# Patient Record
Sex: FEMALE | Race: BLACK OR AFRICAN AMERICAN | NOT HISPANIC OR LATINO | Employment: STUDENT | URBAN - METROPOLITAN AREA
[De-identification: names, ages, dates, MRNs, and addresses within clinical notes are randomized per-mention and may not be internally consistent; named-entity substitution may affect disease eponyms.]

---

## 2017-01-13 ENCOUNTER — OFFICE VISIT (OUTPATIENT)
Dept: ORTHOPEDICS | Facility: CLINIC | Age: 32
End: 2017-01-13
Payer: MEDICARE

## 2017-01-13 ENCOUNTER — HOSPITAL ENCOUNTER (OUTPATIENT)
Dept: RADIOLOGY | Facility: HOSPITAL | Age: 32
Discharge: HOME | End: 2017-01-13
Attending: ORTHOPAEDIC SURGERY
Payer: MEDICARE

## 2017-01-13 VITALS
DIASTOLIC BLOOD PRESSURE: 94 MMHG | WEIGHT: 111.75 LBS | HEIGHT: 62 IN | HEART RATE: 102 BPM | SYSTOLIC BLOOD PRESSURE: 143 MMHG | BODY MASS INDEX: 20.56 KG/M2

## 2017-01-13 DIAGNOSIS — R52 PAIN: Primary | ICD-10-CM

## 2017-01-13 DIAGNOSIS — R52 PAIN: ICD-10-CM

## 2017-01-13 DIAGNOSIS — S92.063G: ICD-10-CM

## 2017-01-13 DIAGNOSIS — E11.610 CHARCOT'S ARTHROPATHY, DIABETIC: ICD-10-CM

## 2017-01-13 PROCEDURE — 99213 OFFICE O/P EST LOW 20 MIN: CPT | Mod: S$PBB,,, | Performed by: ORTHOPAEDIC SURGERY

## 2017-01-13 PROCEDURE — 73650 X-RAY EXAM OF HEEL: CPT | Mod: 26,50,, | Performed by: RADIOLOGY

## 2017-01-13 PROCEDURE — 99999 PR PBB SHADOW E&M-EST. PATIENT-LVL III: CPT | Mod: PBBFAC,,, | Performed by: ORTHOPAEDIC SURGERY

## 2017-01-13 PROCEDURE — 73650 X-RAY EXAM OF HEEL: CPT | Mod: 50,TC

## 2017-01-14 NOTE — PROGRESS NOTES
Ms. Murray returns today.  It has been 10 months since she sustained bilateral   calcaneus fractures with minimal trauma.  She has end-stage renal disease and   renal osteodystrophy.  She is currently on dialysis and is awaiting a kidney   transplant.  She is insulin-dependent diabetic as well.  The fractures of both   heels have been treated closed over time and she has made progress with regards   to pain and function.  Last time she was here was in October and her pain was   essentially resolved, but she was still having some continued swelling.  She   comes back today and again reports further improvement, but she still complained   of continued swelling and actually has had a bit more pain on the right side.    Examination today reveals continued bilateral swelling of her feet and   especially in the hindfoot region.  There is no erythema or warmth.  There is no   gross instability of the hindfoot and she has some minimal tenderness to   palpation.    I ordered and reviewed new x-rays today and on the right side it appears there   has been further collapse of the calcaneus.  There also appears to be a bit of   fragmentation on the dorsum of the talus.  The hindfoot still is in plantigrade   position.  The left heel also shows some continued lucencies.  At this point, I   am concerned she is having more of a Charcot collapse of her hind foot, although   clinically she does not have a significantly reddened or warm hindfoot.  It is   possible that this collapse has occurred in the last several months.  Her last   x-ray was done in July.  What I would like her to do at this point would be to   go back into her fracture boot on the right side and limit her activity as much   as possible.  I am going to have her return to see me in four weeks.  She should   have repeat x-ray of both heels at that time.      MAZIN/TRACIE  dd: 01/13/2017 16:22:52 (CST)  td: 01/14/2017 13:46:21 (CST)  Doc ID   #[id number]  Job ID #[id number]    CC:

## 2017-02-10 ENCOUNTER — HOSPITAL ENCOUNTER (OUTPATIENT)
Dept: RADIOLOGY | Facility: HOSPITAL | Age: 32
Discharge: HOME | End: 2017-02-10
Attending: ORTHOPAEDIC SURGERY
Payer: MEDICARE

## 2017-02-10 ENCOUNTER — OFFICE VISIT (OUTPATIENT)
Dept: ORTHOPEDICS | Facility: CLINIC | Age: 32
End: 2017-02-10
Payer: MEDICARE

## 2017-02-10 VITALS — SYSTOLIC BLOOD PRESSURE: 133 MMHG | HEART RATE: 99 BPM | DIASTOLIC BLOOD PRESSURE: 88 MMHG | HEIGHT: 62 IN

## 2017-02-10 DIAGNOSIS — R52 PAIN: Primary | ICD-10-CM

## 2017-02-10 DIAGNOSIS — E11.610 CHARCOT'S ARTHROPATHY, DIABETIC: ICD-10-CM

## 2017-02-10 DIAGNOSIS — R52 PAIN: ICD-10-CM

## 2017-02-10 DIAGNOSIS — S92.063G: ICD-10-CM

## 2017-02-10 PROCEDURE — 73650 X-RAY EXAM OF HEEL: CPT | Mod: 26,50,, | Performed by: RADIOLOGY

## 2017-02-10 PROCEDURE — 99999 PR PBB SHADOW E&M-EST. PATIENT-LVL III: CPT | Mod: PBBFAC,,, | Performed by: ORTHOPAEDIC SURGERY

## 2017-02-10 PROCEDURE — 99213 OFFICE O/P EST LOW 20 MIN: CPT | Mod: S$PBB,,, | Performed by: ORTHOPAEDIC SURGERY

## 2017-02-10 PROCEDURE — 73650 X-RAY EXAM OF HEEL: CPT | Mod: 50,TC

## 2017-02-10 RX ORDER — SCOPALAMINE 1 MG/3D
PATCH, EXTENDED RELEASE TRANSDERMAL
Refills: 6 | COMMUNITY
Start: 2016-12-12 | End: 2017-05-01

## 2017-02-10 RX ORDER — PANTOPRAZOLE SODIUM 40 MG/1
40 TABLET, DELAYED RELEASE ORAL 2 TIMES DAILY
Refills: 0 | COMMUNITY
Start: 2016-11-20 | End: 2017-05-01

## 2017-02-10 RX ORDER — ETONOGESTREL 68 MG/1
IMPLANT SUBCUTANEOUS
COMMUNITY
Start: 2017-01-25 | End: 2023-02-23

## 2017-02-10 RX ORDER — LORAZEPAM 0.5 MG/1
0.5 TABLET ORAL 3 TIMES DAILY PRN
Refills: 0 | COMMUNITY
Start: 2016-11-20 | End: 2017-05-01

## 2017-02-10 RX ORDER — OMEPRAZOLE 40 MG/1
40 CAPSULE, DELAYED RELEASE ORAL DAILY
Refills: 6 | COMMUNITY
Start: 2016-12-12 | End: 2017-05-01

## 2017-02-10 RX ORDER — CALCITRIOL 0.25 UG/1
0.25 CAPSULE ORAL DAILY
COMMUNITY
End: 2021-01-20

## 2017-02-12 NOTE — PROGRESS NOTES
Ms. Murray returns today.  It has been 11 months, since she sustained bilateral   calcaneus fractures with minimal trauma.  She is a 31-year-old female who has   end-stage renal disease and renal osteodystrophy and is on dialysis, awaiting a   kidney transplant.  She is an insulin-dependent diabetic and has peripheral   neuropathy.  When she was here a month ago, I had repeated x-rays, and there was   evidence of some collapse of the calcaneus on the right side compared to a   previous x-ray.  She did not have any significant swelling or erythema   consistent with an active Charcot process, but I wanted her to come back today   to re-x-ray the foot to see if there are any further changes.  I put her in a   fracture boot, after the last visit as well.  She reports improved pain in the   boot.  On exam, she does not have any significant swelling at this time.  I   ordered new x-rays today, and there has been no further collapse over the last   few weeks.  So, I feel this is more than likely stable.  I am going to have her   continue to use the boot on an as needed basis.  I am going to have her return   to see me in six weeks.  I would like to repeat standing x-rays of her heels at   that time.      SEBASTIAN  dd: 02/11/2017 08:30:15 (CST)  td: 02/11/2017 20:42:32 (CST)  Doc ID   #[id number]  Job ID #[id number]    CC:

## 2017-04-10 ENCOUNTER — OFFICE VISIT (OUTPATIENT)
Dept: ORTHOPEDICS | Facility: CLINIC | Age: 32
End: 2017-04-10
Payer: MEDICARE

## 2017-04-10 ENCOUNTER — HOSPITAL ENCOUNTER (OUTPATIENT)
Dept: RADIOLOGY | Facility: HOSPITAL | Age: 32
Discharge: HOME | End: 2017-04-10
Attending: ORTHOPAEDIC SURGERY
Payer: MEDICARE

## 2017-04-10 VITALS — HEIGHT: 62 IN | BODY MASS INDEX: 20.63 KG/M2 | WEIGHT: 112.13 LBS

## 2017-04-10 DIAGNOSIS — S92.063G: Primary | ICD-10-CM

## 2017-04-10 DIAGNOSIS — E11.610 CHARCOT'S ARTHROPATHY, DIABETIC: ICD-10-CM

## 2017-04-10 DIAGNOSIS — S92.063G: ICD-10-CM

## 2017-04-10 PROCEDURE — 73650 X-RAY EXAM OF HEEL: CPT | Mod: 26,50,, | Performed by: RADIOLOGY

## 2017-04-10 PROCEDURE — 99213 OFFICE O/P EST LOW 20 MIN: CPT | Mod: S$PBB,,, | Performed by: ORTHOPAEDIC SURGERY

## 2017-04-10 PROCEDURE — 99999 PR PBB SHADOW E&M-EST. PATIENT-LVL II: CPT | Mod: PBBFAC,,, | Performed by: ORTHOPAEDIC SURGERY

## 2017-04-10 PROCEDURE — 73650 X-RAY EXAM OF HEEL: CPT | Mod: 50,TC

## 2017-04-11 NOTE — PROGRESS NOTES
Ms. Murray returns today.  This is a 31-year-old female with end-stage renal   disease, on dialysis, who has insulin-dependent diabetes with severe peripheral   neuropathy.  She sustained bilateral intra-articular calcaneus fractures over a   year ago now.  This was a result of minor trauma of jumping up and down.  She   was initially treated closed and over the course of time appeared to be   resolving.  When she returned to see me in January, she had some increased   swelling of the right side and x-rays revealed some further collapse of the   calcaneus.  I had placed her back into a boot and suggested protected   weightbearing.  When she was here last in February, her swelling was improved.    She returns today and she has increased swelling of the right heel with no real   increase in symptoms.  She does have some discomfort.  Her left foot, although   still has some swelling has been stable.  I ordered new x-rays today and there   does not appear to be any further collapse of either bone.  Nevertheless, I am   concerned she has some ongoing Charcot process.  I would recommend a period of   total contact casting on the right side with protected weightbearing.  So we are   going to put her in a cast today.  She will return in two weeks for a cast   change.      MAZIN/TRACIE  dd: 04/11/2017 06:54:20 (CDT)  td: 04/11/2017 14:00:04 (REDDT)  Doc ID   #[id number]  Job ID #[id number]    CC:     This office note has been dictated.

## 2017-04-17 ENCOUNTER — OFFICE VISIT (OUTPATIENT)
Dept: ORTHOPEDICS | Facility: CLINIC | Age: 32
End: 2017-04-17
Payer: MEDICARE

## 2017-04-17 DIAGNOSIS — Z47.89 CAST DISCOMFORT: Primary | ICD-10-CM

## 2017-04-17 PROCEDURE — 29405 APPL SHORT LEG CAST: CPT | Mod: PBBFAC | Performed by: PHYSICIAN ASSISTANT

## 2017-04-17 PROCEDURE — 29405 APPL SHORT LEG CAST: CPT | Mod: 58,S$PBB,, | Performed by: PHYSICIAN ASSISTANT

## 2017-04-17 PROCEDURE — 99499 UNLISTED E&M SERVICE: CPT | Mod: S$PBB,,, | Performed by: PHYSICIAN ASSISTANT

## 2017-04-17 NOTE — PROGRESS NOTES
Patient presents to clinic for cast change. Stated that cast was rubbing her shin causing pain.   Cast removed, abrasion to anterior shin just below tibial tubercle.   Cast change  New SLC placed by cindy.

## 2017-05-01 ENCOUNTER — OFFICE VISIT (OUTPATIENT)
Dept: ORTHOPEDICS | Facility: CLINIC | Age: 32
End: 2017-05-01
Payer: MEDICARE

## 2017-05-01 VITALS — WEIGHT: 112 LBS | HEIGHT: 62 IN | BODY MASS INDEX: 20.61 KG/M2

## 2017-05-01 DIAGNOSIS — E10.40 TYPE 1 DIABETES MELLITUS WITH DIABETIC NEUROPATHY: ICD-10-CM

## 2017-05-01 DIAGNOSIS — S92.063G: ICD-10-CM

## 2017-05-01 DIAGNOSIS — E11.610 CHARCOT'S ARTHROPATHY, DIABETIC: Primary | ICD-10-CM

## 2017-05-01 PROCEDURE — 99213 OFFICE O/P EST LOW 20 MIN: CPT | Mod: PBBFAC | Performed by: ORTHOPAEDIC SURGERY

## 2017-05-01 PROCEDURE — 99213 OFFICE O/P EST LOW 20 MIN: CPT | Mod: S$PBB,,, | Performed by: ORTHOPAEDIC SURGERY

## 2017-05-01 PROCEDURE — 99999 PR PBB SHADOW E&M-EST. PATIENT-LVL III: CPT | Mod: PBBFAC,,, | Performed by: ORTHOPAEDIC SURGERY

## 2017-05-02 NOTE — PROGRESS NOTES
Ms. Murray returns today for followup of her Charcot arthropathy involving both   heels.  She is a 31-year-old female with end-stage renal disease and   insulin-dependent diabetes with severe peripheral neuropathy.  I have had her in   a total contact cast now for the last three weeks due to recent increased   swelling of her right heel.  She had sustained bilateral calcaneus fractures   about a year ago with minimal trauma.  She reports the discomfort that she has   had in her right heel is improved.  I removed the cast today.  There seems to be   a bit less swelling and some of the skin is wrinkled.  There is still some mild   warmth.  I am going to continue with the total contact cast for the next two   weeks.  I am going to have her return at that time for reevaluation.  I also   recommend that she get plugged in for routine diabetic foot care, so I am going   to make a referral for Podiatry.      MAZIN/TRACIE  dd: 05/01/2017 12:53:00 (XOCHITL)  td: 05/02/2017 05:26:59 (XOCHITL)  Doc ID   #[id number]  Job ID #[id number]    CC:

## 2017-05-04 ENCOUNTER — TELEPHONE (OUTPATIENT)
Dept: ORTHOPEDICS | Facility: CLINIC | Age: 32
End: 2017-05-04

## 2017-05-04 NOTE — TELEPHONE ENCOUNTER
----- Message from Natalia Barba sent at 5/4/2017  1:21 PM CDT -----  Contact: self  Patient ask if can move her appointment to 5/12-anytime

## 2017-05-04 NOTE — TELEPHONE ENCOUNTER
Spoke with pt.  Advised that Dr Ruano is not in the office on 5/12,   Pt will keep her 5/15 appointment

## 2017-05-15 ENCOUNTER — HOSPITAL ENCOUNTER (OUTPATIENT)
Dept: RADIOLOGY | Facility: HOSPITAL | Age: 32
Discharge: HOME | End: 2017-05-15
Attending: ORTHOPAEDIC SURGERY
Payer: MEDICARE

## 2017-05-15 ENCOUNTER — OFFICE VISIT (OUTPATIENT)
Dept: ORTHOPEDICS | Facility: CLINIC | Age: 32
End: 2017-05-15
Payer: MEDICARE

## 2017-05-15 DIAGNOSIS — S92.063G: ICD-10-CM

## 2017-05-15 DIAGNOSIS — E11.610 CHARCOT'S ARTHROPATHY, DIABETIC: ICD-10-CM

## 2017-05-15 DIAGNOSIS — E11.610 CHARCOT'S ARTHROPATHY, DIABETIC: Primary | ICD-10-CM

## 2017-05-15 PROCEDURE — 99213 OFFICE O/P EST LOW 20 MIN: CPT | Mod: S$PBB,,, | Performed by: ORTHOPAEDIC SURGERY

## 2017-05-15 PROCEDURE — 73650 X-RAY EXAM OF HEEL: CPT | Mod: TC,RT

## 2017-05-15 PROCEDURE — 73650 X-RAY EXAM OF HEEL: CPT | Mod: 26,RT,, | Performed by: RADIOLOGY

## 2017-05-15 PROCEDURE — 99999 PR PBB SHADOW E&M-EST. PATIENT-LVL I: CPT | Mod: PBBFAC,,, | Performed by: ORTHOPAEDIC SURGERY

## 2017-05-16 NOTE — PROGRESS NOTES
Ms. Murray returns today for followup of her Charcot arthropathy involving both   heels.  This is a 31-year-old female with end-stage renal disease and   insulin-dependent diabetes with severe peripheral neuropathy who sustained   bilateral calcaneus fractures with minimal trauma about a year ago.  She was   treated closed, but more recently her right heel showed progressive collapse and   I have had her in a total contact cast now for the last six weeks or so due to   increased swelling.  Her cast is removed today.  The swelling is improved.    There is minimal warmth.  I ordered and reviewed a new x-ray today and there is   increased sclerosis of the calcaneus suggesting that this is starting to   consolidate.  At this point, I am going to allow her to go back into her   fracture boot.  I want her to get into Podiatry for diabetic foot care   management as well as bracing and orthotics for her Charcot feet.  I am going to   have her return to see me as needed.      MAZIN/TRACIE  dd: 05/15/2017 15:48:20 (XOCHITL)  td: 05/16/2017 08:51:02 (XOCHITL)  Doc ID   #[id number]  Job ID #[id number]    CC:

## 2017-05-30 ENCOUNTER — OFFICE VISIT (OUTPATIENT)
Dept: PODIATRY | Facility: CLINIC | Age: 32
End: 2017-05-30
Payer: MEDICARE

## 2017-05-30 VITALS
HEIGHT: 62 IN | WEIGHT: 109 LBS | BODY MASS INDEX: 20.06 KG/M2 | RESPIRATION RATE: 18 BRPM | DIASTOLIC BLOOD PRESSURE: 91 MMHG | HEART RATE: 96 BPM | SYSTOLIC BLOOD PRESSURE: 131 MMHG

## 2017-05-30 DIAGNOSIS — E11.610 DIABETIC CHARCOT'S FOOT: Primary | ICD-10-CM

## 2017-05-30 PROCEDURE — 99203 OFFICE O/P NEW LOW 30 MIN: CPT | Mod: S$PBB,,, | Performed by: PODIATRIST

## 2017-05-30 PROCEDURE — 99999 PR PBB SHADOW E&M-EST. PATIENT-LVL III: CPT | Mod: PBBFAC,,, | Performed by: PODIATRIST

## 2017-05-30 PROCEDURE — 99213 OFFICE O/P EST LOW 20 MIN: CPT | Mod: PBBFAC | Performed by: PODIATRIST

## 2017-05-30 RX ORDER — CICLOPIROX 80 MG/ML
SOLUTION TOPICAL
COMMUNITY
Start: 2017-04-13 | End: 2021-01-20

## 2017-05-30 NOTE — LETTER
May 30, 2017      Gonzalo Ruano MD  [address]  [facility] [address]           [facility]  [address]  [facility] [address]  Phone: [phone]          Patient: Nina Murray   MR Number: [id number]   YOB: 1985   Date of Visit: 5/30/2017       Dear Dr. Gonzalo Ruano:    Thank you for referring Nina Murray to me for evaluation. Attached you will find relevant portions of my assessment and plan of care.    If you have questions, please do not hesitate to call me. I look forward to following Nina Murray along with you.    Sincerely,    Annamaria Taylor, FARNAZ    Enclosure  CC:  No Recipients    If you would like to receive this communication electronically, please contact [email or web address] or [phone] to request more information on [employer] access.    For providers and/or their staff who would like to refer a patient to [facility], please contact us through our one-stop-shop provider referral line, [facility], at [phone].    If you feel you have received this communication in error or would no longer like to receive these types of communications, please e-mail [email or web address]

## 2017-05-30 NOTE — PROGRESS NOTES
"Subjective:      Patient ID: Nina Murray is a 31 y.o. female.    Chief Complaint: PCP ([facility] ); Diabetic Foot Exam; Foot Problem; and Ankle Injury (both ankles swelling x 1 year ago )    Nina is a 31 y.o. female who presents to the clinic for evaluation and treatment of high risk feet. Nina has a past medical history of Diabetes mellitus and Renal failure. The patient's chief complaint is f/u  B/l charcot. She recently was in a TCC on the R fot charcot. She switched to a walking boot 5/17 w/o issues .    PCP: Primary Doctor No    Date Last Seen by PCP:   Chief Complaint   Patient presents with    PCP     [facility]     Diabetic Foot Exam    Foot Problem    Ankle Injury     both ankles swelling x 1 year ago        Hemoglobin A1C   Date Value Ref Range Status   03/04/2013 7.2 (H) 4.0 - 6.2 % Final       Review of Systems   Constitution: Negative for chills, decreased appetite and fever.   Cardiovascular: Negative for leg swelling.   Musculoskeletal: Negative for arthritis, joint pain, joint swelling and myalgias.   Gastrointestinal: Negative for nausea and vomiting.   Neurological: Negative for loss of balance, numbness and paresthesias.           Objective:       Vitals:    05/30/17 1047   BP: (!) 131/91   Pulse: 96   Resp: 18   Weight: 49.4 kg (109 lb)   Height: 5' 2" (1.575 m)   PainSc: 0-No pain        Physical Exam   Constitutional: She is oriented to person, place, and time. She appears well-developed and well-nourished.   Cardiovascular:   Pulses:       Dorsalis pedis pulses are 2+ on the right side, and 2+ on the left side.        Posterior tibial pulses are 2+ on the right side, and 2+ on the left side.   Musculoskeletal: She exhibits no edema or tenderness.        Right ankle: Normal.        Left ankle: Normal.        Right foot: There is no swelling, no crepitus and no deformity.        Left foot: There is no swelling, no crepitus and no deformity.   Adequate joint range of " motion without pain, limitation, nor crepitation Bilateral feet and ankle joints. Muscle strength is 5/5 in all groups bilaterally.      Flattened , wide b/l heel.  No crepitus noted Flexible pes planus foot type w/ medial arch collapse and mild gastroc equinus      Lymphadenopathy:   No palpable lymph nodes   Neurological: She is alert and oriented to person, place, and time. She has normal strength. A sensory deficit is present.   Skin: Skin is warm, dry and intact. No rash noted. No erythema. Nails show no clubbing.   Psychiatric: She has a normal mood and affect. Her behavior is normal.             Assessment:       Encounter Diagnosis   Name Primary?    Diabetic Charcot's foot Yes         Plan:       Nina was seen today for pcp, diabetic foot exam, foot problem and ankle injury.    Diagnoses and all orders for this visit:    Diabetic Charcot's foot      I counseled the patient on her conditions, their implications and medical management.  Stable b/l Charcot   Continue walking boot R   Will re image in 3 weeks to assess stability   Plan to convert to dm shoes w/ custom inserts, she reports recently receiving shoes this december   Will bring shoes and inserts to next apt for evaluation         .

## 2017-06-20 ENCOUNTER — OFFICE VISIT (OUTPATIENT)
Dept: PODIATRY | Facility: CLINIC | Age: 32
End: 2017-06-20
Payer: MEDICARE

## 2017-06-20 ENCOUNTER — HOSPITAL ENCOUNTER (OUTPATIENT)
Dept: RADIOLOGY | Facility: HOSPITAL | Age: 32
Discharge: HOME | End: 2017-06-20
Attending: PODIATRIST
Payer: MEDICARE

## 2017-06-20 VITALS
BODY MASS INDEX: 20.06 KG/M2 | DIASTOLIC BLOOD PRESSURE: 87 MMHG | WEIGHT: 109 LBS | HEART RATE: 94 BPM | SYSTOLIC BLOOD PRESSURE: 145 MMHG | HEIGHT: 62 IN | RESPIRATION RATE: 18 BRPM

## 2017-06-20 DIAGNOSIS — E11.610 DIABETIC CHARCOT'S FOOT: ICD-10-CM

## 2017-06-20 DIAGNOSIS — E11.610 DIABETIC CHARCOT'S FOOT: Primary | ICD-10-CM

## 2017-06-20 PROCEDURE — 73630 X-RAY EXAM OF FOOT: CPT | Mod: 50,TC

## 2017-06-20 PROCEDURE — 99213 OFFICE O/P EST LOW 20 MIN: CPT | Mod: S$PBB,,, | Performed by: PODIATRIST

## 2017-06-20 PROCEDURE — 73630 X-RAY EXAM OF FOOT: CPT | Mod: 26,50,, | Performed by: RADIOLOGY

## 2017-06-20 PROCEDURE — 99999 PR PBB SHADOW E&M-EST. PATIENT-LVL III: CPT | Mod: PBBFAC,,, | Performed by: PODIATRIST

## 2017-06-20 RX ORDER — MUPIROCIN 20 MG/G
OINTMENT TOPICAL
COMMUNITY
Start: 2017-06-16 | End: 2021-01-20

## 2017-06-20 NOTE — LETTER
June 20, 2017      Gonzalo Ruano MD  [address] [facility] [address]           [facility]  [address]  [facility] [address]  Phone: [phone]          Patient: Nina Murray   MR Number: [id number]   YOB: 1985   Date of Visit: 6/20/2017       Dear Dr. Gonzalo Ruano:    Thank you for referring Nina Murray to me for evaluation. Attached you will find relevant portions of my assessment and plan of care.    If you have questions, please do not hesitate to call me. I look forward to following Nina Murray along with you.    Sincerely,    Annamaria Taylor, FARNAZ    Enclosure  CC:  No Recipients    If you would like to receive this communication electronically, please contact [email or web address] or [phone] to request more information on [employer] Link access.    For providers and/or their staff who would like to refer a patient to [facility], please contact us through our one-stop-shop provider referral line, [facility] [place], at [phone].    If you feel you have received this communication in error or would no longer like to receive these types of communications, please e-mail [email or web address]

## 2017-06-20 NOTE — PROGRESS NOTES
"Subjective:      Patient ID: Nina Murray is a 31 y.o. female.    Chief Complaint: Follow-up (X-ray ); Foot Problem; and Foot Swelling    Nina is a 31 y.o. female who presents to the clinic for evaluation and treatment of high risk feet. Nina has a past medical history of Diabetes mellitus and Renal failure. The patient's chief complaint is f/u  B/l charcot. She recently was in a TCC on the R foot charcot. Has been in boot R w/o issues   PCP: Primary Doctor No    Date Last Seen by PCP:   Chief Complaint   Patient presents with    Follow-up     X-ray     Foot Problem    Foot Swelling       Hemoglobin A1C   Date Value Ref Range Status   03/04/2013 7.2 (H) 4.0 - 6.2 % Final       Review of Systems   Constitution: Negative for chills, decreased appetite and fever.   Cardiovascular: Negative for leg swelling.   Musculoskeletal: Negative for arthritis, joint pain, joint swelling and myalgias.   Gastrointestinal: Negative for nausea and vomiting.   Neurological: Negative for loss of balance, numbness and paresthesias.           Objective:       Vitals:    06/20/17 1156   BP: (!) 145/87   Pulse: 94   Resp: 18   Weight: 49.4 kg (109 lb)   Height: 5' 2" (1.575 m)   PainSc: 0-No pain        Physical Exam   Constitutional: She is oriented to person, place, and time. She appears well-developed and well-nourished.   Cardiovascular:   Pulses:       Dorsalis pedis pulses are 2+ on the right side, and 2+ on the left side.        Posterior tibial pulses are 2+ on the right side, and 2+ on the left side.   Musculoskeletal: She exhibits no edema or tenderness.        Right ankle: Normal.        Left ankle: Normal.        Right foot: There is no swelling, no crepitus and no deformity.        Left foot: There is no swelling, no crepitus and no deformity.   Adequate joint range of motion without pain, limitation, nor crepitation Bilateral feet and ankle joints. Muscle strength is 5/5 in all groups bilaterally.      Flattened , " wide b/l heel.  No crepitus noted Flexible pes planus foot type w/ medial arch collapse and mild gastroc equinus      Lymphadenopathy:   No palpable lymph nodes   Neurological: She is alert and oriented to person, place, and time. She has normal strength. A sensory deficit is present.   Skin: Skin is warm, dry and intact. No rash noted. No erythema. Nails show no clubbing.   Psychiatric: She has a normal mood and affect. Her behavior is normal.             Assessment:       Encounter Diagnosis   Name Primary?    Diabetic Charcot's foot Yes         Plan:       Nina was seen today for follow-up, foot problem and foot swelling.    Diagnoses and all orders for this visit:    Diabetic Charcot's foot  -     X-Ray Foot Complete Bilateral; Future      I counseled the patient on her conditions, their implications and medical management.  Stable  R calcaneal charcot, although L heel shows increased lucency   DM shoe R, transition to walking boot L   Will re image in 3-4 weeks to assess stability         .

## 2017-07-05 ENCOUNTER — OFFICE VISIT (OUTPATIENT)
Dept: PODIATRY | Facility: CLINIC | Age: 32
End: 2017-07-05
Payer: MEDICARE

## 2017-07-05 ENCOUNTER — HOSPITAL ENCOUNTER (OUTPATIENT)
Dept: RADIOLOGY | Facility: HOSPITAL | Age: 32
Discharge: HOME | End: 2017-07-05
Attending: PODIATRIST
Payer: MEDICARE

## 2017-07-05 VITALS
DIASTOLIC BLOOD PRESSURE: 100 MMHG | SYSTOLIC BLOOD PRESSURE: 154 MMHG | HEART RATE: 90 BPM | RESPIRATION RATE: 18 BRPM | HEIGHT: 62 IN | WEIGHT: 109 LBS | BODY MASS INDEX: 20.06 KG/M2

## 2017-07-05 DIAGNOSIS — E11.610 DIABETIC CHARCOT'S FOOT: Primary | ICD-10-CM

## 2017-07-05 DIAGNOSIS — E11.610 DIABETIC CHARCOT'S FOOT: ICD-10-CM

## 2017-07-05 PROCEDURE — 73630 X-RAY EXAM OF FOOT: CPT | Mod: 50,TC

## 2017-07-05 PROCEDURE — 73630 X-RAY EXAM OF FOOT: CPT | Mod: 26,50,, | Performed by: RADIOLOGY

## 2017-07-05 PROCEDURE — 99999 PR PBB SHADOW E&M-EST. PATIENT-LVL III: CPT | Mod: PBBFAC,,, | Performed by: PODIATRIST

## 2017-07-05 PROCEDURE — 99213 OFFICE O/P EST LOW 20 MIN: CPT | Mod: S$PBB,,, | Performed by: PODIATRIST

## 2017-07-05 RX ORDER — LIDOCAINE AND PRILOCAINE 25; 25 MG/G; MG/G
CREAM TOPICAL
COMMUNITY
Start: 2017-06-29 | End: 2021-01-20

## 2017-07-05 NOTE — PROGRESS NOTES
"Subjective:      Patient ID: Nina Murray is a 31 y.o. female.    Chief Complaint: Follow-up; Foot Pain; and Foot Swelling    Nina is a 31 y.o. female who presents to the clinic for evaluation and treatment of high risk feet. Nina has a past medical history of Diabetes mellitus and Renal failure. The patient's chief complaint is f/u  B/l charcot. S. Has been in boot L w/o issues   PCP: Primary Doctor No    Date Last Seen by PCP:   Chief Complaint   Patient presents with    Follow-up    Foot Pain    Foot Swelling       Hemoglobin A1C   Date Value Ref Range Status   03/04/2013 7.2 (H) 4.0 - 6.2 % Final       Review of Systems   Constitution: Negative for chills, decreased appetite and fever.   Cardiovascular: Negative for leg swelling.   Skin: Positive for dry skin.   Musculoskeletal: Negative for arthritis, joint pain, joint swelling and myalgias.   Gastrointestinal: Negative for nausea and vomiting.   Neurological: Negative for loss of balance, numbness and paresthesias.           Objective:       Vitals:    07/05/17 1140 07/05/17 1153   BP: (!) 151/104 (!) 154/100   Pulse: 90    Resp: 18    Weight: 49.4 kg (109 lb)    Height: 5' 2" (1.575 m)    PainSc:   4    PainLoc: Foot         Physical Exam   Constitutional: She is oriented to person, place, and time. She appears well-developed and well-nourished.   Cardiovascular:   Pulses:       Dorsalis pedis pulses are 2+ on the right side, and 2+ on the left side.        Posterior tibial pulses are 2+ on the right side, and 2+ on the left side.   Musculoskeletal: She exhibits no edema or tenderness.        Right ankle: Normal.        Left ankle: Normal.        Right foot: There is no swelling, no crepitus and no deformity.        Left foot: There is no swelling, no crepitus and no deformity.   Adequate joint range of motion without pain, limitation, nor crepitation Bilateral feet and ankle joints. Muscle strength is 5/5 in all groups bilaterally.      Flattened , " wide b/l heel.  No crepitus noted Flexible pes planus foot type w/ medial arch collapse and mild gastroc equinus   Mild increased warmth b/l feet. No crepitus   Lymphadenopathy:   No palpable lymph nodes   Neurological: She is alert and oriented to person, place, and time. She has normal strength. A sensory deficit is present.   Skin: Skin is warm, dry and intact. No rash noted. No erythema. Nails show no clubbing.   Psychiatric: She has a normal mood and affect. Her behavior is normal.             Assessment:       Encounter Diagnosis   Name Primary?    Diabetic Charcot's foot Yes         Plan:       Nina was seen today for follow-up, foot pain and foot swelling.    Diagnoses and all orders for this visit:    Diabetic Charcot's foot  -     X-Ray Foot Complete Bilateral; Future      I counseled the patient on her conditions, their implications and medical management.  Stable  Charcot w/ mild increased warmth?   Continue  boot L . Pt has R boot at home .   Will re image today to assure stability   Will re image in 3-4 weeks to assess stability         .

## 2017-07-05 NOTE — LETTER
July 5, 2017      Gonzalo Ruano MD  [address] [facility] [address]           [facility]  [address]  [facility] [facility] [address]  Phone: [phone]          Patient: Nina Murray   MR Number: [id number]   YOB: 1985   Date of Visit: 7/5/2017       Dear Dr. Gonzalo Ruano:    Thank you for referring Nina Murray to me for evaluation. Attached you will find relevant portions of my assessment and plan of care.    If you have questions, please do not hesitate to call me. I look forward to following Nina Murray along with you.    Sincerely,    Annamaria Taylor, FARNAZ    Enclosure  CC:  No Recipients    If you would like to receive this communication electronically, please contact [email or web address] or [phone] to request more information on [employer] Link access.    For providers and/or their staff who would like to refer a patient to [facility], please contact us through our one-stop-shop provider referral line, [facility], at [phone].    If you feel you have received this communication in error or would no longer like to receive these types of communications, please e-mail [email or web address]

## 2017-09-01 ENCOUNTER — OFFICE VISIT (OUTPATIENT)
Dept: PODIATRY | Facility: CLINIC | Age: 32
End: 2017-09-01
Payer: MEDICARE

## 2017-09-01 VITALS — BODY MASS INDEX: 18.95 KG/M2 | WEIGHT: 103 LBS | HEIGHT: 62 IN | RESPIRATION RATE: 18 BRPM

## 2017-09-01 DIAGNOSIS — E11.610 DIABETIC CHARCOT'S FOOT: Primary | ICD-10-CM

## 2017-09-01 PROCEDURE — 99213 OFFICE O/P EST LOW 20 MIN: CPT | Mod: S$PBB,,, | Performed by: PODIATRIST

## 2017-09-01 PROCEDURE — 99214 OFFICE O/P EST MOD 30 MIN: CPT | Mod: PBBFAC | Performed by: PODIATRIST

## 2017-09-01 PROCEDURE — 99999 PR PBB SHADOW E&M-EST. PATIENT-LVL IV: CPT | Mod: PBBFAC,,, | Performed by: PODIATRIST

## 2017-09-01 RX ORDER — ASPIRIN 81 MG/1
TABLET ORAL
Refills: 2 | COMMUNITY
Start: 2017-07-27 | End: 2021-04-01

## 2017-09-01 RX ORDER — SULFAMETHOXAZOLE AND TRIMETHOPRIM 800; 160 MG/1; MG/1
TABLET ORAL
COMMUNITY
Start: 2017-07-27 | End: 2021-04-01

## 2017-09-01 RX ORDER — POLYETHYLENE GLYCOL 3350 17 G/17G
POWDER, FOR SOLUTION ORAL
COMMUNITY
Start: 2017-08-01 | End: 2021-01-20

## 2017-09-01 RX ORDER — METOCLOPRAMIDE 5 MG/1
5 TABLET ORAL 4 TIMES DAILY
COMMUNITY
End: 2021-01-20

## 2017-09-01 RX ORDER — TACROLIMUS 1 MG/1
CAPSULE, GELATIN COATED ORAL
Refills: 2 | COMMUNITY
Start: 2017-07-27

## 2017-09-01 RX ORDER — LABETALOL 100 MG/1
TABLET, FILM COATED ORAL
COMMUNITY
Start: 2017-08-02 | End: 2021-01-20

## 2017-09-01 RX ORDER — PANTOPRAZOLE SODIUM 40 MG/1
TABLET, DELAYED RELEASE ORAL
COMMUNITY
Start: 2017-07-27 | End: 2021-04-01

## 2017-09-01 RX ORDER — PREDNISONE 5 MG/1
TABLET ORAL
Refills: 2 | COMMUNITY
Start: 2017-07-27

## 2017-09-01 RX ORDER — MYCOPHENOLIC ACID 360 MG/1
TABLET, DELAYED RELEASE ORAL
Refills: 2 | COMMUNITY
Start: 2017-07-27 | End: 2021-04-01

## 2017-09-01 RX ORDER — VALGANCICLOVIR 450 MG/1
TABLET, FILM COATED ORAL
COMMUNITY
Start: 2017-07-28 | End: 2021-04-01

## 2017-09-01 RX ORDER — TRAMADOL HYDROCHLORIDE 50 MG/1
TABLET ORAL
COMMUNITY
Start: 2017-08-01 | End: 2021-01-20

## 2017-09-01 RX ORDER — FLUCONAZOLE 100 MG/1
TABLET ORAL
COMMUNITY
Start: 2017-07-27 | End: 2018-06-06 | Stop reason: ALTCHOICE

## 2017-09-02 NOTE — PROGRESS NOTES
"Subjective:      Patient ID: Nina Murray is a 31 y.o. female.    Chief Complaint: Follow-up (Dr. Cinthya Corona 2016 ); Foot Problem (Charcot's ); and Foot Pain    Nina is a 31 y.o. female who presents to the clinic for evaluation and treatment of high risk feet. Nina has a past medical history of Diabetes mellitus and Renal failure. The patient's chief complaint is f/u  B/l charcot. Reports recent transplant. Feels great. Improved leg and foot swelling. No pain to ankles/feet.     PCP: Primary Doctor No    Date Last Seen by PCP:   Chief Complaint   Patient presents with    Follow-up     Dr. Cinthya Corona 2016     Foot Problem     Charcot's     Foot Pain       Hemoglobin A1C   Date Value Ref Range Status   03/04/2013 7.2 (H) 4.0 - 6.2 % Final       Review of Systems   Constitution: Negative for chills, decreased appetite and fever.   Cardiovascular: Negative for leg swelling.   Skin: Positive for dry skin.   Musculoskeletal: Negative for arthritis, joint pain, joint swelling and myalgias.   Gastrointestinal: Negative for nausea and vomiting.   Neurological: Negative for loss of balance, numbness and paresthesias.           Objective:       Vitals:    09/01/17 0907   Resp: 18   Weight: 46.7 kg (103 lb)   Height: 5' 2" (1.575 m)   PainSc: 0-No pain        Physical Exam   Constitutional: She is oriented to person, place, and time. She appears well-developed and well-nourished.   Cardiovascular:   Pulses:       Dorsalis pedis pulses are 2+ on the right side, and 2+ on the left side.        Posterior tibial pulses are 2+ on the right side, and 2+ on the left side.   Musculoskeletal: She exhibits no edema or tenderness.        Right ankle: Normal.        Left ankle: Normal.        Right foot: There is no swelling, no crepitus and no deformity.        Left foot: There is no swelling, no crepitus and no deformity.   Adequate joint range of motion without pain, limitation, nor crepitation Bilateral feet and ankle joints. " Muscle strength is 5/5 in all groups bilaterally.      Flattened , wide b/l heel.  No crepitus noted Flexible pes planus foot type w/ medial arch collapse and mild gastroc equinus   No d increased warmth b/l feet. No crepitus   Lymphadenopathy:   No palpable lymph nodes   Neurological: She is alert and oriented to person, place, and time. She has normal strength. A sensory deficit is present.   Skin: Skin is warm, dry and intact. No rash noted. No erythema. Nails show no clubbing.   Psychiatric: She has a normal mood and affect. Her behavior is normal.             Assessment:       Encounter Diagnosis   Name Primary?    Diabetic Charcot's foot Yes         Plan:       Nina was seen today for follow-up, foot problem and foot pain.    Diagnoses and all orders for this visit:    Diabetic Charcot's foot      I counseled the patient on her conditions, their implications and medical management.  Stable  Charcot b/l w/o signs of reactivation   No crepitus   Full ROM  Continue DM shoes/inserts    .

## 2017-10-20 ENCOUNTER — TELEPHONE (OUTPATIENT)
Dept: PODIATRY | Facility: CLINIC | Age: 32
End: 2017-10-20

## 2017-10-20 NOTE — TELEPHONE ENCOUNTER
Patient would like to speak with Dr cuevas she needs consult on disabilty paperwork for benefits. I call [employer] and transfer the call for information regarding how she can apply

## 2017-10-20 NOTE — TELEPHONE ENCOUNTER
----- Message from Ashleigh Paz sent at 10/20/2017  1:59 PM CDT -----  Contact: Pt  Pt is calling to speak with nurse in regards to applying for disability due to condition.    Pt can be reached at [phone].  Thank you

## 2017-11-02 ENCOUNTER — OFFICE VISIT (OUTPATIENT)
Dept: PODIATRY | Facility: CLINIC | Age: 32
End: 2017-11-02
Payer: MEDICARE

## 2017-11-02 VITALS
SYSTOLIC BLOOD PRESSURE: 113 MMHG | DIASTOLIC BLOOD PRESSURE: 78 MMHG | HEIGHT: 62 IN | RESPIRATION RATE: 18 BRPM | WEIGHT: 107 LBS | BODY MASS INDEX: 19.69 KG/M2 | HEART RATE: 103 BPM

## 2017-11-02 DIAGNOSIS — E11.610 DIABETIC CHARCOT'S FOOT: Primary | ICD-10-CM

## 2017-11-02 PROCEDURE — 99999 PR PBB SHADOW E&M-EST. PATIENT-LVL III: CPT | Mod: PBBFAC,,, | Performed by: PODIATRIST

## 2017-11-02 PROCEDURE — 99213 OFFICE O/P EST LOW 20 MIN: CPT | Mod: PBBFAC | Performed by: PODIATRIST

## 2017-11-02 PROCEDURE — 99213 OFFICE O/P EST LOW 20 MIN: CPT | Mod: S$GLB,,, | Performed by: PODIATRIST

## 2017-11-02 RX ORDER — METOCLOPRAMIDE 10 MG/1
TABLET ORAL
Refills: 2 | COMMUNITY
Start: 2017-09-26 | End: 2021-01-20

## 2017-11-02 RX ORDER — ASPIRIN 325 MG
TABLET ORAL
Refills: 11 | COMMUNITY
Start: 2017-10-31

## 2017-11-02 RX ORDER — FLUDROCORTISONE ACETATE 0.1 MG/1
TABLET ORAL
COMMUNITY
Start: 2017-10-30 | End: 2021-04-01

## 2017-11-02 RX ORDER — TACROLIMUS 0.5 MG/1
CAPSULE, GELATIN COATED ORAL
Refills: 6 | COMMUNITY
Start: 2017-10-02 | End: 2021-04-01

## 2017-11-02 RX ORDER — LANCETS 30 GAUGE
EACH MISCELLANEOUS
Refills: 0 | COMMUNITY
Start: 2017-10-05 | End: 2021-01-20

## 2017-11-02 RX ORDER — PROMETHAZINE HYDROCHLORIDE 25 MG/1
TABLET ORAL
COMMUNITY
Start: 2017-08-21 | End: 2017-11-02 | Stop reason: SDUPTHER

## 2017-11-02 RX ORDER — LORAZEPAM 1 MG/1
TABLET ORAL
COMMUNITY
Start: 2017-08-30 | End: 2021-01-20

## 2017-11-02 RX ORDER — SCOLOPAMINE TRANSDERMAL SYSTEM 1 MG/1
PATCH, EXTENDED RELEASE TRANSDERMAL
Refills: 0 | COMMUNITY
Start: 2017-08-15 | End: 2021-01-20

## 2017-11-02 NOTE — LETTER
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 These deformities do limit Xavi ability to stand, walk, and perform ADLs. due to this chronic condition, I recommend she receive permament disability. If you have any questions or concerns, or if I can be of further assistance, please do not hesitate to contact my office.    Sincerely,          Annamaria Taylor DPM  Podiatry Clinic  [facility]

## 2017-11-03 NOTE — PROGRESS NOTES
"Subjective:      Patient ID: Nina Murray is a 31 y.o. female.    Chief Complaint: Follow-up; Foot Problem (Charcot's foot ); and Foot Pain    Nina is a 31 y.o. female who presents to the clinic for evaluation and treatment of high risk feet. Nina has a past medical history of Diabetes mellitus and Renal failure. The patient's chief complaint is f/u  B/l charcot. Reports recent transplant is doing well.  She reports she does get some foot pain and swelling after prolonged standing. Today she would like to discuss disability options .    PCP: Primary Doctor No    Date Last Seen by PCP:   Chief Complaint   Patient presents with    Follow-up    Foot Problem     Charcot's foot     Foot Pain       Hemoglobin A1C   Date Value Ref Range Status   03/04/2013 7.2 (H) 4.0 - 6.2 % Final       Review of Systems   Constitution: Negative for chills, decreased appetite and fever.   Cardiovascular: Negative for leg swelling.   Skin: Negative for color change, dry skin and flushing.   Musculoskeletal: Negative for arthritis, joint pain, joint swelling and myalgias.   Gastrointestinal: Negative for nausea and vomiting.   Neurological: Positive for numbness. Negative for loss of balance and paresthesias.           Objective:       Vitals:    11/02/17 1002   BP: 113/78   Pulse: 103   Resp: 18   Weight: 48.5 kg (107 lb)   Height: 5' 2" (1.575 m)   PainSc: 0-No pain        Physical Exam   Constitutional: She is oriented to person, place, and time. She appears well-developed and well-nourished.   Cardiovascular:   Pulses:       Dorsalis pedis pulses are 2+ on the right side, and 2+ on the left side.        Posterior tibial pulses are 2+ on the right side, and 2+ on the left side.   Musculoskeletal: She exhibits no edema or tenderness.        Right ankle: Normal.        Left ankle: Normal.        Right foot: There is no swelling, no crepitus and no deformity.        Left foot: There is no swelling, no crepitus and no deformity. "   Adequate joint range of motion without pain, limitation, nor crepitation Bilateral feet and ankle joints. Muscle strength is 5/5 in all groups bilaterally.      Flattened , wide b/l heel.  No crepitus noted Flexible pes planus foot type w/ medial arch collapse and mild gastroc equinus   No in increased warmth b/l feet. No crepitus   Lymphadenopathy:   No palpable lymph nodes   Neurological: She is alert and oriented to person, place, and time. She has normal strength. A sensory deficit is present.   Skin: Skin is warm, dry and intact. No rash noted. No erythema. Nails show no clubbing.   Psychiatric: She has a normal mood and affect. Her behavior is normal.   Vitals reviewed.            Assessment:       Encounter Diagnosis   Name Primary?    Diabetic Charcot's foot Yes         Plan:       Nina was seen today for follow-up, foot problem and foot pain.    Diagnoses and all orders for this visit:    Diabetic Charcot's foot      I counseled the patient on her conditions, their implications and medical management.  Currently patients charcot remains stable.   However charcot that affects the rearfoot/heel is inherently unstable in nature due to the location and bone type( Cancellous) .   She will need life time follow up and close management to help prevent charcot reactivation.   She will also require life long use of custom dm shoes w/ custom orthoses.  Due to the location of her charcot she is at high risk for future foot/leg amputation and will require lifelong close monitoring and foot protection.

## 2018-06-06 ENCOUNTER — OFFICE VISIT (OUTPATIENT)
Dept: PODIATRY | Facility: CLINIC | Age: 33
End: 2018-06-06
Payer: MEDICARE

## 2018-06-06 VITALS
BODY MASS INDEX: 21.35 KG/M2 | DIASTOLIC BLOOD PRESSURE: 75 MMHG | SYSTOLIC BLOOD PRESSURE: 121 MMHG | HEIGHT: 62 IN | RESPIRATION RATE: 18 BRPM | HEART RATE: 89 BPM | WEIGHT: 116 LBS

## 2018-06-06 DIAGNOSIS — E11.610 DIABETIC CHARCOT'S FOOT: Primary | ICD-10-CM

## 2018-06-06 PROCEDURE — 99213 OFFICE O/P EST LOW 20 MIN: CPT | Mod: S$GLB,,, | Performed by: PODIATRIST

## 2018-06-06 PROCEDURE — 3008F BODY MASS INDEX DOCD: CPT | Mod: CPTII,S$GLB,, | Performed by: PODIATRIST

## 2018-06-06 PROCEDURE — 99999 PR PBB SHADOW E&M-EST. PATIENT-LVL III: CPT | Mod: PBBFAC,,, | Performed by: PODIATRIST

## 2018-06-06 RX ORDER — ONDANSETRON 8 MG/1
8 TABLET, ORALLY DISINTEGRATING ORAL 3 TIMES DAILY PRN
Refills: 5 | COMMUNITY
Start: 2018-05-15 | End: 2021-04-01

## 2018-06-06 NOTE — PROGRESS NOTES
"Subjective:      Patient ID: Nina Murray is a 32 y.o. female.    Chief Complaint: PCP (Dr. Cinthya Foster 12/17) and Diabetic Foot Exam (Charcot's )    Nina is a 32 y.o. female who presents to the clinic for evaluation and treatment of high risk feet. Nina has a past medical history of Diabetes mellitus and Renal failure. The patient's chief complaint is f/u  B/l charcot. No pain to the feet     PCP: Primary Doctor No    Date Last Seen by PCP:   Chief Complaint   Patient presents with    PCP     Dr. Cinthya Foster 12/17    Diabetic Foot Exam     Charcot's        Hemoglobin A1C   Date Value Ref Range Status   03/04/2013 7.2 (H) 4.0 - 6.2 % Final       Review of Systems   Constitution: Negative for chills, decreased appetite and fever.   Cardiovascular: Negative for leg swelling.   Skin: Negative for color change, dry skin and flushing.   Musculoskeletal: Negative for arthritis, joint pain, joint swelling and myalgias.   Gastrointestinal: Negative for nausea and vomiting.   Neurological: Positive for numbness. Negative for loss of balance and paresthesias.           Objective:       Vitals:    06/06/18 1026   BP: 121/75   Pulse: 89   Resp: 18   Weight: 52.6 kg (116 lb)   Height: 5' 2" (1.575 m)   PainSc: 0-No pain        Physical Exam   Constitutional: She is oriented to person, place, and time. She appears well-developed and well-nourished.   Cardiovascular:   Pulses:       Dorsalis pedis pulses are 2+ on the right side, and 2+ on the left side.        Posterior tibial pulses are 2+ on the right side, and 2+ on the left side.   Musculoskeletal: She exhibits no edema or tenderness.        Right ankle: Normal.        Left ankle: Normal.        Right foot: There is no swelling, no crepitus and no deformity.        Left foot: There is no swelling, no crepitus and no deformity.   Adequate joint range of motion without pain, limitation, nor crepitation Bilateral feet and ankle joints. Muscle strength is " 5/5 in all groups bilaterally.      Flattened , wide b/l heel.  No crepitus noted Flexible pes planus foot type w/ medial arch collapse and mild gastroc equinus   No in increased warmth b/l feet. No crepitus   Lymphadenopathy:   No palpable lymph nodes   Neurological: She is alert and oriented to person, place, and time. She has normal strength. A sensory deficit is present.   Skin: Skin is warm, dry and intact. No rash noted. No erythema. Nails show no clubbing.   Psychiatric: She has a normal mood and affect. Her behavior is normal.   Vitals reviewed.            Assessment:       Encounter Diagnosis   Name Primary?    Diabetic Charcot's foot Yes         Plan:       Nina was seen today for pcp and diabetic foot exam.    Diagnoses and all orders for this visit:    Diabetic Charcot's foot      I counseled the patient on her conditions, their implications and medical management.  Currently patients charcot remains stable.   Recommend bracing for R ankle due to charcot changes, she may benefit from custom [place] brace in the future   Patient fitted and dispensed Gauntlet style lace up and instructed on use

## 2018-08-10 ENCOUNTER — TELEPHONE (OUTPATIENT)
Dept: PODIATRY | Facility: CLINIC | Age: 33
End: 2018-08-10

## 2018-08-10 NOTE — TELEPHONE ENCOUNTER
----- Message from Paige Pacheco sent at 8/10/2018  1:48 PM CDT -----  Contact: Self/ [phone]  Patient Returning Call from [facility]    Who Left Message for Patient: RUSSEL Hitchcock   Communication Preference: Cell phone [phone]

## 2018-08-10 NOTE — TELEPHONE ENCOUNTER
Left message for pat. To let her know that Dr. MARSHALL Is out on maternity leave right now and that we only have ankle braces, they are not coming in leather. So for right now I'm not sure what she was talking about, ask her to call back and let us know if she has any further questions

## 2018-08-10 NOTE — TELEPHONE ENCOUNTER
----- Message from Nathan Lopez sent at 8/10/2018 10:57 AM CDT -----  Contact: self  Pt stated she would like the leather ankle cuff that was offered by doctor Palacios.Pt can be reached at [phone].

## 2018-08-13 ENCOUNTER — TELEPHONE (OUTPATIENT)
Dept: PODIATRY | Facility: CLINIC | Age: 33
End: 2018-08-13

## 2018-08-14 ENCOUNTER — TELEPHONE (OUTPATIENT)
Dept: PODIATRY | Facility: CLINIC | Age: 33
End: 2018-08-14

## 2018-08-14 NOTE — TELEPHONE ENCOUNTER
Called patient to let her know that I talked to Dr. Davis about the [place] brace and he wrote a RX for her. Told Patient that I will send it out to her house by mail and if she has further questions to call me back. Send the orthotic vendor list too

## 2018-08-16 ENCOUNTER — TELEPHONE (OUTPATIENT)
Dept: PODIATRY | Facility: CLINIC | Age: 33
End: 2018-08-16

## 2018-08-16 NOTE — TELEPHONE ENCOUNTER
----- Message from Sandra Marroquin sent at 8/16/2018 10:20 AM CDT -----  Contact: patient  Please call pt at [phone]. Patient has questions regarding the forms sent to her by MD office    Thank you

## 2018-08-16 NOTE — TELEPHONE ENCOUNTER
Spoke with pt wanted to know if she can got to any store on the vendors list for a brace  Made aware yes and it has  The insurance  At the bottom per pt okay thank you

## 2018-12-04 ENCOUNTER — OFFICE VISIT (OUTPATIENT)
Dept: PODIATRY | Facility: CLINIC | Age: 33
End: 2018-12-04
Payer: MEDICARE

## 2018-12-04 VITALS
RESPIRATION RATE: 18 BRPM | WEIGHT: 104.06 LBS | BODY MASS INDEX: 19.15 KG/M2 | SYSTOLIC BLOOD PRESSURE: 136 MMHG | HEIGHT: 62 IN | DIASTOLIC BLOOD PRESSURE: 85 MMHG | HEART RATE: 95 BPM

## 2018-12-04 DIAGNOSIS — E11.610 DIABETIC CHARCOT'S FOOT: Primary | ICD-10-CM

## 2018-12-04 PROCEDURE — 99213 OFFICE O/P EST LOW 20 MIN: CPT | Mod: S$GLB,,, | Performed by: PODIATRIST

## 2018-12-04 PROCEDURE — 99999 PR PBB SHADOW E&M-EST. PATIENT-LVL III: CPT | Mod: PBBFAC,,, | Performed by: PODIATRIST

## 2018-12-04 PROCEDURE — 3008F BODY MASS INDEX DOCD: CPT | Mod: CPTII,S$GLB,, | Performed by: PODIATRIST

## 2018-12-05 NOTE — PROGRESS NOTES
"Subjective:      Patient ID: Nina Murray is a 33 y.o. female.    Chief Complaint: PCP (Cinthya GREWAL MD. [facility] 11/18); Foot Problem (Rt foot ); and Follow-up (Charcot's )    Nina is a 33 y.o. female who presents to the clinic for evaluation and treatment of high risk feet. Nina has a past medical history of Diabetes mellitus and Renal failure. The patient's chief complaint is f/u  B/l charcot. No pain to the feet     PCP: Primary Doctor No    Date Last Seen by PCP:   Chief Complaint   Patient presents with    PCP     Cinthya GREWAL MD. [facility] 11/18    Foot Problem     Rt foot     Follow-up     Charcot's        Hemoglobin A1C   Date Value Ref Range Status   03/04/2013 7.2 (H) 4.0 - 6.2 % Final       Review of Systems   Constitution: Negative for chills, decreased appetite and fever.   Cardiovascular: Negative for leg swelling.   Skin: Negative for color change, dry skin and flushing.   Musculoskeletal: Positive for joint pain. Negative for arthritis, joint swelling and myalgias.   Gastrointestinal: Negative for nausea and vomiting.   Neurological: Positive for numbness. Negative for loss of balance and paresthesias.           Objective:       Vitals:    12/04/18 1116   BP: 136/85   Pulse: 95   Resp: 18   Weight: 47.2 kg (104 lb 0.9 oz)   Height: 5' 2" (1.575 m)   PainSc: 0-No pain        Physical Exam   Constitutional: She is oriented to person, place, and time. She appears well-developed and well-nourished.   Cardiovascular:   Pulses:       Dorsalis pedis pulses are 2+ on the right side, and 2+ on the left side.        Posterior tibial pulses are 2+ on the right side, and 2+ on the left side.   Musculoskeletal: She exhibits no edema or tenderness.        Right ankle: Normal.        Left ankle: Normal.        Right foot: There is no swelling, no crepitus and no deformity.        Left foot: There is no swelling, no crepitus and no deformity.   Adequate joint range of motion without pain, " limitation, nor crepitation Bilateral feet and ankle joints. Muscle strength is 5/5 in all groups bilaterally.      Flattened , wide b/l heel.  No crepitus noted Flexible pes planus foot type w/ medial arch collapse and mild gastroc equinus   No in increased warmth b/l feet. No crepitus   Lymphadenopathy:   No palpable lymph nodes   Neurological: She is alert and oriented to person, place, and time. She has normal strength. A sensory deficit is present.   Skin: Skin is warm, dry and intact. No rash noted. No erythema. Nails show no clubbing.   Psychiatric: She has a normal mood and affect. Her behavior is normal.   Vitals reviewed.            Assessment:       Encounter Diagnosis   Name Primary?    Diabetic Charcot's foot Yes         Plan:       Nina was seen today for pcp, foot problem and follow-up.    Diagnoses and all orders for this visit:    Diabetic Charcot's foot      I counseled the patient on her conditions, their implications and medical management.  Currently patients charcot remains stable.   Custom brace is pending . She has been fitted    RTC 6 m

## 2019-06-19 ENCOUNTER — OFFICE VISIT (OUTPATIENT)
Dept: PODIATRY | Facility: CLINIC | Age: 34
End: 2019-06-19
Payer: MEDICARE

## 2019-06-19 VITALS
WEIGHT: 112.44 LBS | BODY MASS INDEX: 20.69 KG/M2 | DIASTOLIC BLOOD PRESSURE: 76 MMHG | HEART RATE: 95 BPM | SYSTOLIC BLOOD PRESSURE: 121 MMHG | HEIGHT: 62 IN | RESPIRATION RATE: 18 BRPM

## 2019-06-19 DIAGNOSIS — E11.610 DIABETIC CHARCOT'S FOOT: Primary | ICD-10-CM

## 2019-06-19 PROCEDURE — 3008F PR BODY MASS INDEX (BMI) DOCUMENTED: ICD-10-PCS | Mod: CPTII,S$GLB,, | Performed by: PODIATRIST

## 2019-06-19 PROCEDURE — 99214 PR OFFICE/OUTPT VISIT, EST, LEVL IV, 30-39 MIN: ICD-10-PCS | Mod: S$GLB,,, | Performed by: PODIATRIST

## 2019-06-19 PROCEDURE — 3008F BODY MASS INDEX DOCD: CPT | Mod: CPTII,S$GLB,, | Performed by: PODIATRIST

## 2019-06-19 PROCEDURE — 99999 PR PBB SHADOW E&M-EST. PATIENT-LVL III: ICD-10-PCS | Mod: PBBFAC,,, | Performed by: PODIATRIST

## 2019-06-19 PROCEDURE — 99214 OFFICE O/P EST MOD 30 MIN: CPT | Mod: S$GLB,,, | Performed by: PODIATRIST

## 2019-06-19 PROCEDURE — 99999 PR PBB SHADOW E&M-EST. PATIENT-LVL III: CPT | Mod: PBBFAC,,, | Performed by: PODIATRIST

## 2019-06-19 NOTE — PROGRESS NOTES
Subjective:      Patient ID: Nina Murray is a 33 y.o. female.    Chief Complaint: PCP (Raphael Mendes ); Diabetic Foot Exam; and charcot's    Nina is a 33 y.o. female who presents to the clinic upon referral from Dr. Peggy pedersen. provider found  for evaluation and treatment of diabetic feet. Nina has a past medical history of Diabetes mellitus and Renal failure. Patient relates no major problem with feet. Only complaints today consist of yearly comprehensive diabetic  foot examination. No longer on DM meds 2/2 transplant. No foot pain .    PCP: Primary Doctor No    Date Last Seen by PCP:   Chief Complaint   Patient presents with    PCP     Raphael Mendes     Diabetic Foot Exam    charcot's         Current shoe gear: Casual shoes    Hemoglobin A1C   Date Value Ref Range Status   03/04/2013 7.2 (H) 4.0 - 6.2 % Final           Review of Systems   Constitution: Negative for chills, decreased appetite and fever.   Cardiovascular: Negative for leg swelling.   Skin: Positive for poor wound healing.   Musculoskeletal: Negative for arthritis, joint pain, joint swelling and myalgias.   Gastrointestinal: Negative for nausea and vomiting.   Neurological: Negative for loss of balance, numbness and paresthesias.           There is no problem list on file for this patient.      Current Outpatient Medications on File Prior to Visit   Medication Sig Dispense Refill    aspirin (ECOTRIN) 81 MG EC tablet TK 1 T PO QD  2    calcitRIOL (ROCALTROL) 0.25 MCG Cap Take 0.25 mcg by mouth once daily.      calcium acetate (PHOSLO) 667 mg capsule   3    ciclopirox (PENLAC) 8 % Soln       fludrocortisone (FLORINEF) 0.1 mg Tab       HUMALOG 100 unit/mL injection   2    labetalol (NORMODYNE) 100 MG tablet       leg brace (ANKLE BRACE) [facility] (1) [place] ankle brace for Right ankle. 1 each 0    lidocaine-prilocaine (EMLA) cream       LORazepam (ATIVAN) 1 MG tablet       MELPAQUE HP 4 % Crea   3    metoclopramide HCl (REGLAN) 10 MG  tablet TK 1 T PO TID  2    metoclopramide HCl (REGLAN) 5 MG tablet Take 5 mg by mouth 4 (four) times daily.      mupirocin (BACTROBAN) 2 % ointment       MYFORTIC 360 mg TbEC TK 2 TS PO BID  2    NEXPLANON 68 mg Impl       ondansetron (ZOFRAN-ODT) 8 MG TbDL Take 8 mg by mouth 3 (three) times daily as needed.  5    ONE DAILY MULTIVITAMIN 400 mcg Tab TK 1 T PO D  11    ONETOUCH ULTRA TEST Strp USE TO TEST BLOOD SUGAR 6 TIMES DAILY  6    ONETOUCH ULTRA2 kit USE TO TEST BLOOD SUGAR 3 TIMES A DAY AS DIRECTED  0    pantoprazole (PROTONIX) 40 MG tablet       polyethylene glycol (GLYCOLAX) 17 gram PwPk       predniSONE (DELTASONE) 5 MG tablet TK 4 TS PO D  2    PROGRAF 0.5 mg Cap TK ONE C PO  BID  6    PROGRAF 1 mg Cap TK 5 CS PO BID  2    scopolamine (TRANSDERM-SCOP) 1.3-1.5 mg (1 mg over 3 days) NATANAEL 1 PATCH AA Q 72 H  0    sulfamethoxazole-trimethoprim 800-160mg (BACTRIM DS) 800-160 mg Tab       tramadol (ULTRAM) 50 mg tablet       valganciclovir (VALCYTE) 450 mg Tab        No current facility-administered medications on file prior to visit.        Review of patient's allergies indicates:   Allergen Reactions    Vicodin [hydrocodone-acetaminophen] Nausea And Vomiting       Past Surgical History:   Procedure Laterality Date     SECTION, CLASSIC         History reviewed. No pertinent family history.    Social History     Socioeconomic History    Marital status: Single     Spouse name: Not on file    Number of children: Not on file    Years of education: Not on file    Highest education level: Not on file   Occupational History    Not on file   Social Needs    Financial resource strain: Not on file    Food insecurity:     Worry: Not on file     Inability: Not on file    Transportation needs:     Medical: Not on file     Non-medical: Not on file   Tobacco Use    Smoking status: Never Smoker    Smokeless tobacco: Never Used   Substance and Sexual Activity    Alcohol use: No      "Alcohol/week: 0.0 oz    Drug use: No    Sexual activity: Yes     Partners: Male     Birth control/protection: None   Lifestyle    Physical activity:     Days per week: Not on file     Minutes per session: Not on file    Stress: Not on file   Relationships    Social connections:     Talks on phone: Not on file     Gets together: Not on file     Attends Orthodox service: Not on file     Active member of club or organization: Not on file     Attends meetings of clubs or organizations: Not on file     Relationship status: Not on file   Other Topics Concern    Not on file   Social History Narrative    Not on file               Objective:       Vitals:    06/19/19 0945   BP: 121/76   Pulse: 95   Resp: 18   Weight: 51 kg (112 lb 7 oz)   Height: 5' 2" (1.575 m)   PainSc: 0-No pain        Physical Exam   Constitutional: She appears well-developed and well-nourished.  Non-toxic appearance. She does not have a sickly appearance. No distress.   alert and oriented x 3.    Cardiovascular:   Pulses:       Dorsalis pedis pulses are 2+ on the right side, and 2+ on the left side.        Posterior tibial pulses are 2+ on the right side, and 2+ on the left side.    Capillary refill time is within normal limits. Digital hair present.    Pulmonary/Chest: No respiratory distress.   Musculoskeletal: She exhibits deformity (charcot ankle b/l ).        Right ankle: No tenderness. No lateral malleolus, no medial malleolus, no AITFL, no CF ligament and no posterior TFL tenderness found. Achilles tendon exhibits no pain, no defect and normal Bush's test results.        Left ankle: No tenderness. No lateral malleolus, no medial malleolus, no AITFL, no CF ligament and no posterior TFL tenderness found. Achilles tendon exhibits no pain, no defect and normal Bush's test results.        Right foot: There is no tenderness and no bony tenderness.        Left foot: There is no tenderness and no bony tenderness.   Adequate joint range of " motion without pain, limitation, nor crepitation Bilateral feet and ankle joints. Muscle strength is 5/5 in all groups bilaterally.        Stable b/l ankle charcot    Feet:   Right Foot:   Protective Sensation: 5 sites tested. 5 sites sensed.   Left Foot:   Protective Sensation: 5 sites tested. 5 sites sensed.   Lymphadenopathy:   No lymphatic streaking     Neurological: She displays no atrophy. No sensory deficit.   Light touch present     Skin: Skin is warm, dry and intact. No rash noted. She is not diaphoretic. No cyanosis. No pallor. Nails show no clubbing.   Skin is of normal turgor.   Normal temperature gradient.  Examination of the skin reveals no evidence of significant rashes, open lesions, suspicious appearing nevi or other concerning lesions.      Toenails 1-5 bilaterally are neatly trimmed; of normal color and thickness     Psychiatric: Her mood appears not anxious. Her affect is not inappropriate. Her speech is not slurred. She is not combative. She is communicative. She is attentive.   Nursing note and vitals reviewed.            Assessment:       Encounter Diagnosis   Name Primary?    Diabetic Charcot's foot Yes         Plan:       Nina was seen today for pcp, diabetic foot exam and charcot's.    Diagnoses and all orders for this visit:    Diabetic Charcot's foot      I counseled the patient on her conditions, their implications and medical management.      - Shoe inspection. Diabetic Foot Education. Patient reminded of the importance of good nutrition and blood sugar control to help prevent podiatric complications of diabetes. Patient instructed on proper foot hygeine. We discussed wearing proper shoe gear, daily foot inspections, never walking without protective shoe gear, caution putting sharp instruments to feet     - Discussed DM foot care:  Wear comfortable, proper fitting shoes. Wash feet daily. Dry well. After drying, apply moisturizer to feet (no lotion to webspaces). Inspect feet daily  for skin breaks, blisters, swelling, or redness. Wear cotton socks (preferably white)  Change socks every day. Do NOT walk barefoot. Do NOT use heating pads or warm/hot water soaks     - Discussed importance of daily moisturizer to the feet such as Gold bonds diabetic foot cream    - Patient is low risk for developing lower extremity issues secondary to diabetes. I recommend continued yearly diabetic foot examinations.     - Patients PCP can perform yearly foot checks . Currently  patient has no pedal manifestations of DM    - Patients with out pedal manifestations of DM, do not qualify for nail/callus trimming     - RTC in  PRN     .

## 2020-01-15 NOTE — TELEPHONE ENCOUNTER
Called lydia and lia appt to see Dr. Palacios    [FreeTextEntry1] : D&C Hysteroscopy under sedation GL [Follow Up] : follow up GYN visit

## 2020-04-24 ENCOUNTER — TELEPHONE (OUTPATIENT)
Dept: ORTHOPEDICS | Facility: CLINIC | Age: 35
End: 2020-04-24

## 2020-04-24 NOTE — TELEPHONE ENCOUNTER
Spoke with patient. Appointment scheduled with Dr Welsh on 4/29 at [facility]. Patient is aware of date, time and location.

## 2020-04-24 NOTE — TELEPHONE ENCOUNTER
----- Message from Lizz Trejo MA sent at 4/24/2020  9:53 AM CDT -----  Contact: self       ----- Message -----  From: Phuong Andrew MA  Sent: 4/24/2020   8:50 AM CDT  To: [facility] Triage        ----- Message -----  From: Abbi Be MA  Sent: 4/24/2020   8:49 AM CDT  To: [facility] Clinical Support        ----- Message -----  From: Jameson Mancini  Sent: 4/24/2020   8:27 AM CDT  To: [facility] Staff    Pt stated that he right arm is very painful to where she cannot move it.  She stated that she would like a call from a nurse to discuss her options of pain relief and to schedule her an appointment as soon as possible     Contact [facility] [phone]

## 2020-04-29 ENCOUNTER — TELEPHONE (OUTPATIENT)
Dept: ORTHOPEDICS | Facility: CLINIC | Age: 35
End: 2020-04-29

## 2020-04-29 NOTE — TELEPHONE ENCOUNTER
Spoke with patient. Patient stated that her arm is feeling a lot better and wants to cancel appointment. Advised patient to give us a call if pain gets worse.

## 2020-04-29 NOTE — TELEPHONE ENCOUNTER
----- Message from Pauline Plummer sent at 4/29/2020 10:09 AM CDT -----  Contact: self, [phone]  Patient requests to speak with you regarding today's appt and not sure if she should still come in. Please advise.

## 2020-08-18 ENCOUNTER — OFFICE VISIT (OUTPATIENT)
Dept: OBSTETRICS AND GYNECOLOGY | Facility: CLINIC | Age: 35
End: 2020-08-18
Payer: MEDICAID

## 2020-08-18 VITALS
DIASTOLIC BLOOD PRESSURE: 70 MMHG | BODY MASS INDEX: 20.08 KG/M2 | WEIGHT: 109.81 LBS | SYSTOLIC BLOOD PRESSURE: 110 MMHG

## 2020-08-18 DIAGNOSIS — Z01.419 WOMEN'S ANNUAL ROUTINE GYNECOLOGICAL EXAMINATION: Primary | ICD-10-CM

## 2020-08-18 PROCEDURE — 99499 NO LOS: ICD-10-PCS | Mod: S$PBB,,, | Performed by: ADVANCED PRACTICE MIDWIFE

## 2020-08-18 PROCEDURE — 99499 UNLISTED E&M SERVICE: CPT | Mod: S$PBB,,, | Performed by: ADVANCED PRACTICE MIDWIFE

## 2020-08-18 PROCEDURE — 99999 PR PBB SHADOW E&M-EST. PATIENT-LVL IV: ICD-10-PCS | Mod: PBBFAC,,, | Performed by: ADVANCED PRACTICE MIDWIFE

## 2020-08-18 PROCEDURE — 99214 OFFICE O/P EST MOD 30 MIN: CPT | Mod: PBBFAC,PO | Performed by: ADVANCED PRACTICE MIDWIFE

## 2020-08-18 PROCEDURE — 99999 PR PBB SHADOW E&M-EST. PATIENT-LVL IV: CPT | Mod: PBBFAC,,, | Performed by: ADVANCED PRACTICE MIDWIFE

## 2020-08-18 RX ORDER — NORGESTIMATE AND ETHINYL ESTRADIOL 0.25-0.035
1 KIT ORAL DAILY
COMMUNITY
End: 2021-01-27 | Stop reason: SDUPTHER

## 2020-08-18 NOTE — PROGRESS NOTES
Pt in today for follow up and desires a second opinion r/t her gynecological care. Pt sees an OB/Gyn at [facility]. Pt reports a hx of an abnormal Pap, pos HPV in Dec 2019. Pt had a subsequent colposcopy in Jan 202 and a follow up Pap in M. Pt reports was advised to have a follow up Pap in July of 2020. Pt desires a second opinion as to her care and needed follow up. Discussed with pt recommendation to get records for review and appt with MD to discuss. Pt desires to obtain her records and will schedule an appt when she has them.

## 2020-08-19 ENCOUNTER — OFFICE VISIT (OUTPATIENT)
Dept: PODIATRY | Facility: CLINIC | Age: 35
End: 2020-08-19
Payer: MEDICAID

## 2020-08-19 VITALS
HEART RATE: 114 BPM | DIASTOLIC BLOOD PRESSURE: 69 MMHG | BODY MASS INDEX: 20.21 KG/M2 | HEIGHT: 62 IN | SYSTOLIC BLOOD PRESSURE: 109 MMHG | WEIGHT: 109.81 LBS

## 2020-08-19 DIAGNOSIS — M19.90 ARTHROSIS: ICD-10-CM

## 2020-08-19 DIAGNOSIS — E10.40 TYPE 1 DIABETES, CONTROLLED, WITH NEUROPATHY: Primary | ICD-10-CM

## 2020-08-19 PROCEDURE — 99999 PR PBB SHADOW E&M-EST. PATIENT-LVL V: ICD-10-PCS | Mod: PBBFAC,,, | Performed by: PODIATRIST

## 2020-08-19 PROCEDURE — 99215 OFFICE O/P EST HI 40 MIN: CPT | Mod: PBBFAC,PN | Performed by: PODIATRIST

## 2020-08-19 PROCEDURE — 99212 OFFICE O/P EST SF 10 MIN: CPT | Mod: S$PBB,,, | Performed by: PODIATRIST

## 2020-08-19 PROCEDURE — 99212 PR OFFICE/OUTPT VISIT, EST, LEVL II, 10-19 MIN: ICD-10-PCS | Mod: S$PBB,,, | Performed by: PODIATRIST

## 2020-08-19 PROCEDURE — 99999 PR PBB SHADOW E&M-EST. PATIENT-LVL V: CPT | Mod: PBBFAC,,, | Performed by: PODIATRIST

## 2020-08-19 RX ORDER — FAMOTIDINE 20 MG/1
TABLET, FILM COATED ORAL
COMMUNITY
Start: 2020-05-19 | End: 2021-01-20

## 2020-08-19 RX ORDER — SODIUM BICARBONATE 650 MG/1
TABLET ORAL
COMMUNITY
Start: 2020-05-19 | End: 2021-04-01

## 2020-08-19 NOTE — PROGRESS NOTES
Subjective:      Patient ID: Nina Murray is a 34 y.o. female.    Chief Complaint: Charcot's foot    Nina is a 34 y.o. female who presents to the clinic upon referral from Dr. Pemberton  for evaluation and treatment of diabetic feet. Nina has a past medical history of Abnormal Pap smear of cervix, Anemia, Diabetes mellitus, and Renal failure. Patient relates no major problem with feet. Only complaints today consist of Diabetes, increased risk amputation needing evaluation/management/optomization of foot care.  No current complaints.    PCP: Primary Doctor No    Date Last Seen by PCP:   Chief Complaint   Patient presents with    Charcot's foot        Current shoe gear: Casual shoes    Hemoglobin A1C   Date Value Ref Range Status   03/04/2013 7.2 (H) 4.0 - 6.2 % Final           Review of Systems   Constitution: Negative for chills, diaphoresis, fever, malaise/fatigue and night sweats.   Cardiovascular: Negative for claudication, cyanosis, leg swelling and syncope.   Skin: Negative for color change, dry skin, nail changes, rash, suspicious lesions and unusual hair distribution.   Musculoskeletal: Positive for arthritis, joint pain and joint swelling. Negative for falls, muscle cramps, muscle weakness and stiffness.   Gastrointestinal: Negative for constipation, diarrhea, nausea and vomiting.   Neurological: Positive for sensory change. Negative for brief paralysis, disturbances in coordination, focal weakness, numbness, paresthesias and tremors.           Objective:      Physical Exam  Constitutional:       General: She is not in acute distress.     Appearance: She is well-developed. She is not diaphoretic.   Cardiovascular:      Pulses:           Popliteal pulses are 2+ on the right side and 2+ on the left side.        Dorsalis pedis pulses are 2+ on the right side and 2+ on the left side.        Posterior tibial pulses are 2+ on the right side and 2+ on the left side.      Comments: Capillary refill 3 seconds  all toes/distal feet, all toes/both feet warm to touch.      Negative lymphadenopathy bilateral popliteal fossa and tarsal tunnel.      Negavie lower extremity edema bilateral.    Musculoskeletal:      Right ankle: She exhibits normal range of motion, no swelling, no ecchymosis, no deformity, no laceration and normal pulse. Achilles tendon normal. Achilles tendon exhibits no pain, no defect and normal Bush's test results.      Comments: No stj range of motion right or left.  Decreased calcaneal/arch height bilateral without loss of function, acute trauma signs, or casa deformity.    Ankle dorsiflexion decreased at <10 degrees bilateral with no increase with knee flexion bilateral.    Otherwise, Normal angle, base, station of gait. All ten toes without clubbing, cyanosis, or signs of ischemia.  No pain to palpation bilateral lower extremities.  Range of motion, stability, muscle strength, and muscle tone normal bilateral feet and legs.    Lymphadenopathy:      Lower Body: No right inguinal adenopathy. No left inguinal adenopathy.      Comments: Negative lymphadenopathy bilateral popliteal fossa and tarsal tunnel.    Negative lymphangitic streaking bilateral feet/ankles/legs.   Skin:     General: Skin is warm and dry.      Capillary Refill: Capillary refill takes 2 to 3 seconds.      Coloration: Skin is not pale.      Findings: No abrasion, bruising, burn, ecchymosis, erythema, laceration, lesion or rash.      Nails: There is no clubbing.        Comments: Normal angle, base, station of gait. All ten toes without clubbing, cyanosis, or signs of ischemia.  No pain to palpation bilateral lower extremities.  Range of motion, stability, muscle strength, and muscle tone normal bilateral feet and legs.    All toenails normal color and trophic qualities.      Neurological:      Mental Status: She is alert and oriented to person, place, and time.      Sensory: Sensory deficit present.      Motor: No tremor, atrophy or  abnormal muscle tone.      Gait: Gait normal.      Deep Tendon Reflexes:      Reflex Scores:       Patellar reflexes are 2+ on the right side and 2+ on the left side.       Achilles reflexes are 2+ on the right side and 2+ on the left side.     Comments: Negative tinel sign to percussion sural, superficial peroneal, deep peroneal, saphenous, and posterior tibial nerves right and left ankles and feet.    Decreased/absent vibratory sensation bilateral feet to 128Hz tuning fork.     Psychiatric:         Behavior: Behavior is cooperative.               Assessment:       Encounter Diagnoses   Name Primary?    Type 1 diabetes, controlled, with neuropathy Yes    Arthrosis          Plan:       Nina was seen today for charcot's foot.    Diagnoses and all orders for this visit:    Type 1 diabetes, controlled, with neuropathy    Arthrosis      I counseled the patient on her conditions, their implications and medical management.        - Shoe inspection. Diabetic Foot Education. Patient reminded of the importance of good nutrition and blood sugar control to help prevent podiatric complications of diabetes. Patient instructed on proper foot hygeine. We discussed wearing proper shoe gear, daily foot inspections, never walking without protective shoe gear, never putting sharp instruments to feet, routine podiatric visits at least annually.    Discussed conservative treatment with shoes of adequate dimensions, material, and style to alleviate symptoms and delay or prevent surgical intervention.            Follow up in about 1 year (around 8/19/2021).         normal...

## 2020-11-18 ENCOUNTER — OFFICE VISIT (OUTPATIENT)
Dept: OBSTETRICS AND GYNECOLOGY | Facility: CLINIC | Age: 35
End: 2020-11-18
Attending: OBSTETRICS & GYNECOLOGY
Payer: MEDICAID

## 2020-11-18 VITALS
SYSTOLIC BLOOD PRESSURE: 118 MMHG | HEIGHT: 62 IN | BODY MASS INDEX: 19.88 KG/M2 | WEIGHT: 108 LBS | DIASTOLIC BLOOD PRESSURE: 80 MMHG

## 2020-11-18 DIAGNOSIS — Z23 NEED FOR HPV VACCINE: Primary | ICD-10-CM

## 2020-11-18 PROCEDURE — 99213 OFFICE O/P EST LOW 20 MIN: CPT | Mod: S$PBB,,, | Performed by: OBSTETRICS & GYNECOLOGY

## 2020-11-18 PROCEDURE — 99214 OFFICE O/P EST MOD 30 MIN: CPT | Mod: PBBFAC,PN | Performed by: OBSTETRICS & GYNECOLOGY

## 2020-11-18 PROCEDURE — 99999 PR PBB SHADOW E&M-EST. PATIENT-LVL IV: CPT | Mod: PBBFAC,,, | Performed by: OBSTETRICS & GYNECOLOGY

## 2020-11-18 PROCEDURE — 99213 PR OFFICE/OUTPT VISIT, EST, LEVL III, 20-29 MIN: ICD-10-PCS | Mod: S$PBB,,, | Performed by: OBSTETRICS & GYNECOLOGY

## 2020-11-18 PROCEDURE — 99999 PR PBB SHADOW E&M-EST. PATIENT-LVL IV: ICD-10-PCS | Mod: PBBFAC,,, | Performed by: OBSTETRICS & GYNECOLOGY

## 2020-11-18 NOTE — PROGRESS NOTES
Reason for visit: Follow-up    HPI:    35 y.o. female     New patient: YES    No LMP recorded. (Menstrual status: Birth Control).     The patient is here today to discuss pap smear results. She states that she was previously seeing a provider vane [facility]. Last pap smear was in the end of  and then she had a colposcopy done in 2020. The patient states she was told that after that colposcopy she needed to have pap smears every 3 months. She is unsure of the exact results of her testing and forgot to bring her records with her today, states she has them at home.     Contraception: Nexplanon, patient also reports taking OCPs due to heavy, irregular bleeding  Pap: No result found, unclear history, colposcopy in 2020  Mammogram: N/A    Past Medical History:   Diagnosis Date    Abnormal Pap smear of cervix     Anemia     Diabetes mellitus     Renal failure      Past Surgical History:   Procedure Laterality Date     SECTION, CLASSIC      COMBINED KIDNEY-PANCREAS TRANSPLANT         Social History     Tobacco Use    Smoking status: Never Smoker    Smokeless tobacco: Never Used   Substance Use Topics    Alcohol use: No     Alcohol/week: 0.0 standard drinks     History reviewed. No pertinent family history.  OB History    Para Term  AB Living   1 1   1   1   SAB TAB Ectopic Multiple Live Births                  # Outcome Date GA Lbr Nahun/2nd Weight Sex Delivery Anes PTL Lv   1  /    M CS-LVertical  Y        Medications: Reviewed with patient and updated in EMR.  Allergies: Vicodin [hydrocodone-acetaminophen]       Review of Systems   Constitutional: Negative for fever.   Respiratory: Negative for shortness of breath.    Cardiovascular: Negative for chest pain.   Gastrointestinal: Negative for abdominal pain, nausea and vomiting.   Endocrine: Negative for hot flashes.   Genitourinary: Negative for menstrual problem.   Integumentary:  Negative for breast mass, nipple  "discharge and breast skin changes.   Neurological: Negative for headaches.   Hematological: Does not bruise/bleed easily.   Psychiatric/Behavioral: Negative for depression.   Breast: Negative for mass, mastodynia, nipple discharge and skin changes        Vitals: /80   Ht 5' 2" (1.575 m)   Wt 49 kg (108 lb 0.4 oz)   BMI 19.76 kg/m²     Physical Exam  Constitutional:       General: She is not in acute distress.     Appearance: Normal appearance. She is well-developed.   Neck:      Musculoskeletal: Normal range of motion and neck supple.   Pulmonary:      Effort: Pulmonary effort is normal. No respiratory distress.   Musculoskeletal:      Right lower leg: No edema.      Left lower leg: No edema.   Neurological:      Mental Status: She is alert and oriented to person, place, and time.   Skin:     Findings: No rash.   Psychiatric:         Mood and Affect: Mood and affect normal.           Assessment & Plan:    Need for HPV vaccine  -     Prior authorization Order         Discussed with patient that we will need to review records of pathology results, she will bring records to next appointment   Will plan for pap smear with cotesting in 01/2021   Discussed with patient that we do not recommend taking OCPs while having Nexplanon in place. Recommended she stop taking the OCPs and should she experience vaginal bleeding for >7 days she should call the clinic and we can prescribe Estrase for bleeding control. Patient voiced understanding and agreed with the plan.   HPV status and vaccination were discussed with the patient. We discussed that there are several strains of HPV, some of which are responsible for causing certain precancers and cancers including cervical, other anogenital, and oropharyngeal. Other strains are responsible for causing anogenital warts. We discussed that the currently available Gardasil vaccine innoculates against the most common strains causing both cancers and anogenital warts. The " vaccination can help prevent new HPV infections, but does not treat already existing strains; therefore, getting the vaccine earlier prior to HPV infection is preferable, and vaccination prior to the onset of sexual activity has been shown to be most effective. The three part series of vaccination at 0, 2, and 6 months in patients ages 15 and older was reviewed. The patient is interested in HPV vaccination. A brochure for Gardasil was given to the patient to review.    Follow up in 2 months for pap smear w/ cotesting.    Mayte Shay M.D.  OB/GYN PGY-1

## 2021-01-20 ENCOUNTER — OFFICE VISIT (OUTPATIENT)
Dept: OBSTETRICS AND GYNECOLOGY | Facility: CLINIC | Age: 36
End: 2021-01-20
Attending: OBSTETRICS & GYNECOLOGY
Payer: MEDICAID

## 2021-01-20 VITALS
WEIGHT: 105.63 LBS | DIASTOLIC BLOOD PRESSURE: 74 MMHG | SYSTOLIC BLOOD PRESSURE: 118 MMHG | BODY MASS INDEX: 19.31 KG/M2

## 2021-01-20 DIAGNOSIS — Z01.419 WELL WOMAN EXAM WITH ROUTINE GYNECOLOGICAL EXAM: Primary | ICD-10-CM

## 2021-01-20 PROCEDURE — 99395 PREV VISIT EST AGE 18-39: CPT | Mod: S$PBB,,, | Performed by: OBSTETRICS & GYNECOLOGY

## 2021-01-20 PROCEDURE — 99395 PR PREVENTIVE VISIT,EST,18-39: ICD-10-PCS | Mod: S$PBB,,, | Performed by: OBSTETRICS & GYNECOLOGY

## 2021-01-20 PROCEDURE — 88141 CYTOPATH C/V INTERPRET: CPT | Mod: ,,, | Performed by: PATHOLOGY

## 2021-01-20 PROCEDURE — 99999 PR PBB SHADOW E&M-EST. PATIENT-LVL III: CPT | Mod: PBBFAC,,, | Performed by: OBSTETRICS & GYNECOLOGY

## 2021-01-20 PROCEDURE — 88175 CYTOPATH C/V AUTO FLUID REDO: CPT | Performed by: PATHOLOGY

## 2021-01-20 PROCEDURE — 87624 HPV HI-RISK TYP POOLED RSLT: CPT

## 2021-01-20 PROCEDURE — 99213 OFFICE O/P EST LOW 20 MIN: CPT | Mod: PBBFAC,PN | Performed by: OBSTETRICS & GYNECOLOGY

## 2021-01-20 PROCEDURE — 88141 PR  CYTOPATH CERV/VAG INTERPRET: ICD-10-PCS | Mod: ,,, | Performed by: PATHOLOGY

## 2021-01-20 PROCEDURE — 99999 PR PBB SHADOW E&M-EST. PATIENT-LVL III: ICD-10-PCS | Mod: PBBFAC,,, | Performed by: OBSTETRICS & GYNECOLOGY

## 2021-01-26 ENCOUNTER — TELEPHONE (OUTPATIENT)
Dept: OBSTETRICS AND GYNECOLOGY | Facility: CLINIC | Age: 36
End: 2021-01-26

## 2021-01-26 DIAGNOSIS — N92.6 IRREGULAR UTERINE BLEEDING: Primary | ICD-10-CM

## 2021-01-27 LAB
HPV HR 12 DNA SPEC QL NAA+PROBE: POSITIVE
HPV16 AG SPEC QL: NEGATIVE
HPV18 DNA SPEC QL NAA+PROBE: NEGATIVE

## 2021-01-27 RX ORDER — NORGESTIMATE AND ETHINYL ESTRADIOL 0.25-0.035
1 KIT ORAL DAILY
Qty: 28 TABLET | Refills: 12 | Status: SHIPPED | OUTPATIENT
Start: 2021-01-27 | End: 2021-04-01

## 2021-02-15 LAB
FINAL PATHOLOGIC DIAGNOSIS: ABNORMAL
Lab: ABNORMAL

## 2021-02-23 ENCOUNTER — TELEPHONE (OUTPATIENT)
Dept: OBSTETRICS AND GYNECOLOGY | Facility: CLINIC | Age: 36
End: 2021-02-23

## 2021-04-01 ENCOUNTER — PROCEDURE VISIT (OUTPATIENT)
Dept: OBSTETRICS AND GYNECOLOGY | Facility: CLINIC | Age: 36
End: 2021-04-01
Attending: OBSTETRICS & GYNECOLOGY
Payer: MEDICAID

## 2021-04-01 VITALS
HEIGHT: 62 IN | DIASTOLIC BLOOD PRESSURE: 72 MMHG | SYSTOLIC BLOOD PRESSURE: 98 MMHG | WEIGHT: 104.5 LBS | BODY MASS INDEX: 19.23 KG/M2

## 2021-04-01 DIAGNOSIS — R87.612 LGSIL ON PAP SMEAR OF CERVIX: Primary | ICD-10-CM

## 2021-04-01 LAB
B-HCG UR QL: NEGATIVE
CTP QC/QA: YES

## 2021-04-01 PROCEDURE — 57454 BX/CURETT OF CERVIX W/SCOPE: CPT | Mod: PBBFAC,PN | Performed by: OBSTETRICS & GYNECOLOGY

## 2021-04-01 PROCEDURE — 57454 COLPOSCOPY: ICD-10-PCS | Mod: S$PBB,,, | Performed by: OBSTETRICS & GYNECOLOGY

## 2021-04-01 PROCEDURE — 88305 TISSUE EXAM BY PATHOLOGIST: CPT | Performed by: PATHOLOGY

## 2021-04-01 PROCEDURE — 88305 TISSUE EXAM BY PATHOLOGIST: CPT | Mod: 26,,, | Performed by: PATHOLOGY

## 2021-04-01 PROCEDURE — 81025 URINE PREGNANCY TEST: CPT | Mod: PBBFAC,PN | Performed by: OBSTETRICS & GYNECOLOGY

## 2021-04-01 PROCEDURE — 88305 TISSUE EXAM BY PATHOLOGIST: ICD-10-PCS | Mod: 26,,, | Performed by: PATHOLOGY

## 2021-04-01 RX ORDER — MYCOPHENOLIC ACID 180 MG/1
180 TABLET, DELAYED RELEASE ORAL 2 TIMES DAILY
COMMUNITY
Start: 2021-03-19

## 2021-04-08 ENCOUNTER — TELEPHONE (OUTPATIENT)
Dept: OBSTETRICS AND GYNECOLOGY | Facility: CLINIC | Age: 36
End: 2021-04-08

## 2021-04-08 LAB
FINAL PATHOLOGIC DIAGNOSIS: NORMAL
GROSS: NORMAL

## 2021-04-09 ENCOUNTER — TELEPHONE (OUTPATIENT)
Dept: OBSTETRICS AND GYNECOLOGY | Facility: CLINIC | Age: 36
End: 2021-04-09

## 2021-04-12 PROBLEM — N87.0 CIN I (CERVICAL INTRAEPITHELIAL NEOPLASIA I): Status: ACTIVE | Noted: 2021-04-12

## 2021-06-29 ENCOUNTER — TELEPHONE (OUTPATIENT)
Dept: PODIATRY | Facility: CLINIC | Age: 36
End: 2021-06-29

## 2021-07-15 ENCOUNTER — OFFICE VISIT (OUTPATIENT)
Dept: PODIATRY | Facility: CLINIC | Age: 36
End: 2021-07-15
Payer: MEDICAID

## 2021-07-15 ENCOUNTER — APPOINTMENT (OUTPATIENT)
Dept: RADIOLOGY | Facility: OTHER | Age: 36
End: 2021-07-15
Attending: PODIATRIST
Payer: MEDICAID

## 2021-07-15 VITALS
HEART RATE: 97 BPM | HEIGHT: 62 IN | BODY MASS INDEX: 19.14 KG/M2 | DIASTOLIC BLOOD PRESSURE: 57 MMHG | WEIGHT: 104 LBS | SYSTOLIC BLOOD PRESSURE: 95 MMHG

## 2021-07-15 DIAGNOSIS — M79.671 FOOT PAIN, BILATERAL: ICD-10-CM

## 2021-07-15 DIAGNOSIS — M79.672 FOOT PAIN, BILATERAL: ICD-10-CM

## 2021-07-15 DIAGNOSIS — E10.40 TYPE 1 DIABETES, CONTROLLED, WITH NEUROPATHY: ICD-10-CM

## 2021-07-15 DIAGNOSIS — M19.90 ARTHROSIS: ICD-10-CM

## 2021-07-15 DIAGNOSIS — E10.40 TYPE 1 DIABETES, CONTROLLED, WITH NEUROPATHY: Primary | ICD-10-CM

## 2021-07-15 PROCEDURE — 73630 X-RAY EXAM OF FOOT: CPT | Mod: TC,50

## 2021-07-15 PROCEDURE — 99213 OFFICE O/P EST LOW 20 MIN: CPT | Mod: S$PBB,,, | Performed by: PODIATRIST

## 2021-07-15 PROCEDURE — 99213 PR OFFICE/OUTPT VISIT, EST, LEVL III, 20-29 MIN: ICD-10-PCS | Mod: S$PBB,,, | Performed by: PODIATRIST

## 2021-07-15 PROCEDURE — 99999 PR PBB SHADOW E&M-EST. PATIENT-LVL III: CPT | Mod: PBBFAC,,, | Performed by: PODIATRIST

## 2021-07-15 PROCEDURE — 73630 XR FOOT COMPLETE 3 VIEW BILATERAL: ICD-10-PCS | Mod: 26,50,, | Performed by: RADIOLOGY

## 2021-07-15 PROCEDURE — 73630 X-RAY EXAM OF FOOT: CPT | Mod: 26,50,, | Performed by: RADIOLOGY

## 2021-07-15 PROCEDURE — 99213 OFFICE O/P EST LOW 20 MIN: CPT | Mod: PBBFAC,PN | Performed by: PODIATRIST

## 2021-07-15 PROCEDURE — 99999 PR PBB SHADOW E&M-EST. PATIENT-LVL III: ICD-10-PCS | Mod: PBBFAC,,, | Performed by: PODIATRIST

## 2021-07-15 RX ORDER — NORGESTIMATE AND ETHINYL ESTRADIOL 0.25-0.035
1 KIT ORAL DAILY
COMMUNITY
Start: 2021-07-09 | End: 2022-01-19 | Stop reason: SDUPTHER

## 2021-07-15 RX ORDER — FERROUS SULFATE TAB 325 MG (65 MG ELEMENTAL FE) 325 (65 FE) MG
1 TAB ORAL DAILY
COMMUNITY
Start: 2021-04-21 | End: 2022-09-30

## 2022-01-19 ENCOUNTER — OFFICE VISIT (OUTPATIENT)
Dept: OBSTETRICS AND GYNECOLOGY | Facility: CLINIC | Age: 37
End: 2022-01-19
Attending: OBSTETRICS & GYNECOLOGY
Payer: MEDICAID

## 2022-01-19 ENCOUNTER — LAB VISIT (OUTPATIENT)
Dept: LAB | Facility: OTHER | Age: 37
End: 2022-01-19
Attending: OBSTETRICS & GYNECOLOGY
Payer: MEDICAID

## 2022-01-19 VITALS
HEIGHT: 62 IN | BODY MASS INDEX: 19.88 KG/M2 | DIASTOLIC BLOOD PRESSURE: 60 MMHG | SYSTOLIC BLOOD PRESSURE: 100 MMHG | WEIGHT: 108 LBS

## 2022-01-19 DIAGNOSIS — N87.0 CIN I (CERVICAL INTRAEPITHELIAL NEOPLASIA I): ICD-10-CM

## 2022-01-19 DIAGNOSIS — Z01.419 WELL WOMAN EXAM WITH ROUTINE GYNECOLOGICAL EXAM: Primary | ICD-10-CM

## 2022-01-19 DIAGNOSIS — E11.9 TYPE 2 DIABETES MELLITUS WITHOUT COMPLICATION, UNSPECIFIED WHETHER LONG TERM INSULIN USE: ICD-10-CM

## 2022-01-19 LAB
ALBUMIN SERPL BCP-MCNC: 3.9 G/DL (ref 3.5–5.2)
ALP SERPL-CCNC: 85 U/L (ref 55–135)
ALT SERPL W/O P-5'-P-CCNC: 10 U/L (ref 10–44)
ANION GAP SERPL CALC-SCNC: 10 MMOL/L (ref 8–16)
AST SERPL-CCNC: 14 U/L (ref 10–40)
BILIRUB SERPL-MCNC: 0.8 MG/DL (ref 0.1–1)
BUN SERPL-MCNC: 17 MG/DL (ref 6–20)
CALCIUM SERPL-MCNC: 9.7 MG/DL (ref 8.7–10.5)
CHLORIDE SERPL-SCNC: 113 MMOL/L (ref 95–110)
CO2 SERPL-SCNC: 18 MMOL/L (ref 23–29)
CREAT SERPL-MCNC: 0.9 MG/DL (ref 0.5–1.4)
EST. GFR  (AFRICAN AMERICAN): >60 ML/MIN/1.73 M^2
EST. GFR  (NON AFRICAN AMERICAN): >60 ML/MIN/1.73 M^2
ESTIMATED AVG GLUCOSE: 103 MG/DL (ref 68–131)
GLUCOSE SERPL-MCNC: 79 MG/DL (ref 70–110)
HBA1C MFR BLD: 5.2 % (ref 4–5.6)
POTASSIUM SERPL-SCNC: 4.2 MMOL/L (ref 3.5–5.1)
PROT SERPL-MCNC: 7.1 G/DL (ref 6–8.4)
SODIUM SERPL-SCNC: 141 MMOL/L (ref 136–145)

## 2022-01-19 PROCEDURE — 99999 PR PBB SHADOW E&M-EST. PATIENT-LVL III: CPT | Mod: PBBFAC,,, | Performed by: OBSTETRICS & GYNECOLOGY

## 2022-01-19 PROCEDURE — 99395 PR PREVENTIVE VISIT,EST,18-39: ICD-10-PCS | Mod: S$PBB,,, | Performed by: OBSTETRICS & GYNECOLOGY

## 2022-01-19 PROCEDURE — 1159F PR MEDICATION LIST DOCUMENTED IN MEDICAL RECORD: ICD-10-PCS | Mod: CPTII,,, | Performed by: OBSTETRICS & GYNECOLOGY

## 2022-01-19 PROCEDURE — 3078F DIAST BP <80 MM HG: CPT | Mod: CPTII,,, | Performed by: OBSTETRICS & GYNECOLOGY

## 2022-01-19 PROCEDURE — 87625 HPV TYPES 16 & 18 ONLY: CPT | Mod: 59

## 2022-01-19 PROCEDURE — 3008F PR BODY MASS INDEX (BMI) DOCUMENTED: ICD-10-PCS | Mod: CPTII,,, | Performed by: OBSTETRICS & GYNECOLOGY

## 2022-01-19 PROCEDURE — 3074F PR MOST RECENT SYSTOLIC BLOOD PRESSURE < 130 MM HG: ICD-10-PCS | Mod: CPTII,,, | Performed by: OBSTETRICS & GYNECOLOGY

## 2022-01-19 PROCEDURE — 99213 OFFICE O/P EST LOW 20 MIN: CPT | Mod: PBBFAC | Performed by: OBSTETRICS & GYNECOLOGY

## 2022-01-19 PROCEDURE — 99999 PR PBB SHADOW E&M-EST. PATIENT-LVL III: ICD-10-PCS | Mod: PBBFAC,,, | Performed by: OBSTETRICS & GYNECOLOGY

## 2022-01-19 PROCEDURE — 88175 CYTOPATH C/V AUTO FLUID REDO: CPT

## 2022-01-19 PROCEDURE — 80053 COMPREHEN METABOLIC PANEL: CPT | Performed by: OBSTETRICS & GYNECOLOGY

## 2022-01-19 PROCEDURE — 87624 HPV HI-RISK TYP POOLED RSLT: CPT

## 2022-01-19 PROCEDURE — 83036 HEMOGLOBIN GLYCOSYLATED A1C: CPT | Performed by: OBSTETRICS & GYNECOLOGY

## 2022-01-19 PROCEDURE — 36415 COLL VENOUS BLD VENIPUNCTURE: CPT | Performed by: OBSTETRICS & GYNECOLOGY

## 2022-01-19 PROCEDURE — 3044F PR MOST RECENT HEMOGLOBIN A1C LEVEL <7.0%: ICD-10-PCS | Mod: CPTII,,, | Performed by: OBSTETRICS & GYNECOLOGY

## 2022-01-19 PROCEDURE — 3008F BODY MASS INDEX DOCD: CPT | Mod: CPTII,,, | Performed by: OBSTETRICS & GYNECOLOGY

## 2022-01-19 PROCEDURE — 99395 PREV VISIT EST AGE 18-39: CPT | Mod: S$PBB,,, | Performed by: OBSTETRICS & GYNECOLOGY

## 2022-01-19 PROCEDURE — 1159F MED LIST DOCD IN RCRD: CPT | Mod: CPTII,,, | Performed by: OBSTETRICS & GYNECOLOGY

## 2022-01-19 PROCEDURE — 3074F SYST BP LT 130 MM HG: CPT | Mod: CPTII,,, | Performed by: OBSTETRICS & GYNECOLOGY

## 2022-01-19 PROCEDURE — 3078F PR MOST RECENT DIASTOLIC BLOOD PRESSURE < 80 MM HG: ICD-10-PCS | Mod: CPTII,,, | Performed by: OBSTETRICS & GYNECOLOGY

## 2022-01-19 PROCEDURE — 3044F HG A1C LEVEL LT 7.0%: CPT | Mod: CPTII,,, | Performed by: OBSTETRICS & GYNECOLOGY

## 2022-01-19 RX ORDER — NORGESTIMATE AND ETHINYL ESTRADIOL 0.25-0.035
1 KIT ORAL DAILY
Qty: 28 TABLET | Refills: 12 | Status: SHIPPED | OUTPATIENT
Start: 2022-01-19 | End: 2022-01-19

## 2022-01-19 RX ORDER — MYCOPHENOLIC ACID 360 MG/1
TABLET, DELAYED RELEASE ORAL
COMMUNITY
Start: 2022-01-14 | End: 2022-09-30

## 2022-01-19 RX ORDER — NORGESTIMATE AND ETHINYL ESTRADIOL 0.25-0.035
1 KIT ORAL DAILY
Qty: 90 TABLET | Refills: 3 | Status: SHIPPED | OUTPATIENT
Start: 2022-01-19 | End: 2023-02-22 | Stop reason: SDUPTHER

## 2022-01-19 NOTE — LETTER
January 19, 2022      [facility]  [address] [address]  Phone: [phone]  Fax: [phone]       Patient: Nina Murray   YOB: 1985  Date of Visit: 01/19/2022    To Whom It May Concern:    Jac Murray  was at [facility] on 01/19/2022. The patient may return to work/school on 1/20/2022 with no restrictions. If you have any questions or concerns, or if I can be of further assistance, please do not hesitate to contact me.    Sincerely,    Viji Ames LPN

## 2022-01-20 NOTE — PROGRESS NOTES
"Reason for visit: Well Woman      HPI:    36 y.o. female  presents for a well woman exam. No complaints today.     Menstrual cycles: nexplanon in place, additional OCP for irregular bleeding pattern 2/2 nexplanon; patient declines alternative contraception     Menopausal: not applicable     History of abnormal paps: 2021- LGSIL/ HPV+ other type   Abnormal or postmenopausal bleeding: not applicable   History of abnormal mammograms: not applicable  Family history of breast or ovarian cancer: denies  Any breast masses, pain, skin changes, or nipple discharge: denies  Possible recent STD exposure: denies, not currently sexually active   Contraception: Nexplanon and OCP     Patient has h/o T1DM; currently not following with provider/ no current medications, has followed with renal and podiatry in past.       Reviewed:    Pap: 2022, 2021- LGSIL/ HPV positive other type; Colposcopy 2021- CIN1   Mammogram: not applicable    Past medical, surgical, social, family, and obstetric history: Reviewed and updated in EMR.  Medications: Reviewed and updated in EMR.  Allergies: Vicodin [hydrocodone-acetaminophen]      Review of Systems   Constitutional: Negative for appetite change, diaphoresis, fever and unexpected weight change.   Eyes: Negative for visual disturbance.   Respiratory: Negative for cough.    Cardiovascular: Negative for chest pain and leg swelling.   Gastrointestinal: Negative for abdominal pain, constipation, diarrhea, nausea and vomiting.   Genitourinary: Negative for vaginal bleeding.   Musculoskeletal: Negative for arthralgias and back pain.   Integumentary:  Negative for nipple discharge.   Neurological: Negative for headaches.   Psychiatric/Behavioral: The patient is not nervous/anxious.    Breast: Negative for nipple discharge        Vitals: /60   Ht 5' 2" (1.575 m)   Wt 49 kg (108 lb)   LMP 11/15/2021   BMI 19.75 kg/m²     Physical Exam  Constitutional:       Appearance: Normal " appearance. She is normal weight.   Genitourinary:      Bladder and urethral meatus normal.      Right Labia: No rash, tenderness or lesions.     Left Labia: No tenderness, lesions or rash.     No vaginal discharge, erythema, tenderness, bleeding or ulceration.        Right Adnexa: not tender, not full and no mass present.     Left Adnexa: not tender, not full and no mass present.     No cervical motion tenderness, discharge, friability or lesion.      Uterus is not enlarged, tender or irregular.      Uterus is anteverted.   HENT:      Head: Normocephalic.      Nose: Nose normal.      Mouth/Throat:      Mouth: Mucous membranes are moist.   Eyes:      Pupils: Pupils are equal, round, and reactive to light.   Cardiovascular:      Rate and Rhythm: Normal rate.   Pulmonary:      Effort: Pulmonary effort is normal. No respiratory distress.   Abdominal:      General: Abdomen is flat. There is no distension.      Palpations: Abdomen is soft. There is no mass.      Tenderness: There is no abdominal tenderness. There is no guarding or rebound.   Musculoskeletal:         General: Normal range of motion.   Neurological:      Mental Status: She is alert and oriented to person, place, and time.   Skin:     General: Skin is warm.      Capillary Refill: Capillary refill takes less than 2 seconds.   Psychiatric:         Mood and Affect: Mood normal.         Behavior: Behavior normal.         Thought Content: Thought content normal.         Judgment: Judgment normal.           Assessment and Plan:     Well woman exam with routine gynecological exam    JEAN-PIRERE I (cervical intraepithelial neoplasia I)  -     Liquid-Based Pap Smear, Screening  -     HPV High Risk Genotypes, PCR    Type 2 diabetes mellitus without complication, unspecified whether long term insulin use  -     Comprehensive Metabolic Panel; Future; Expected date: 01/19/2022  -     Hemoglobin A1C; Future; Expected date: 01/19/2022    Other orders  -     Discontinue: ESTARYLLA  0.25-35 mg-mcg per tablet; Take 1 tablet by mouth once daily.  Dispense: 28 tablet; Refill: 12  -     ESTARYLLA 0.25-35 mg-mcg per tablet; Take 1 tablet by mouth once daily.  Dispense: 90 tablet; Refill: 3           Annual exam  o Breast and pelvic exam: benign   o Patient was counseled on ASCCP guidelines for cervical cytology screening  o Cervical screening: HPV/ pap collected today, will follow-up with results in the setting of previously abnormal pap and colposcopy   o Patient was counseled on current recommendations for breast cancer screening  o Mammogram screening: @ 40yoa unless new risk factors warrant earlier screening  o Colonoscopy screening: @ 45yoa unless new risk factors warrant earlier screening  o VitD/Ca supplementation recommendations based on age:  - <50yoa - Ca 1000mg/day, VitD 600IU/day  - 51-70yoa - Ca 1200mg/day, VitD 600IU/day  - >71yoa - Ca 1200mg/day, VitD 800IU/day   STD testing: declines STI screening    Contraception: nexplanon and OCPs, declines alternative contraception today. Will refill OCP.    Will collect CMP and A1c to evaluate blood glucose and kidney function, will attempt to obtain records for previous providers, including Dr. Arshad from [facility].     She was counseled to follow up with her PCP for other routine health maintenance

## 2022-01-24 LAB
CLINICAL INFO: ABNORMAL
CYTO CVX: ABNORMAL
CYTOLOGIST CVX/VAG CYTO: ABNORMAL
CYTOLOGIST CVX/VAG CYTO: ABNORMAL
CYTOLOGY CMNT CVX/VAG CYTO-IMP: ABNORMAL
CYTOLOGY PAP THIN PREP EXPLANATION: ABNORMAL
DATE OF PREVIOUS PAP: ABNORMAL
DATE PREVIOUS BX: NO
GEN CATEG CVX/VAG CYTO-IMP: ABNORMAL
HPV I/H RISK 4 DNA CVX QL NAA+PROBE: DETECTED
HPV16 DNA CVX QL PROBE+SIG AMP: NOT DETECTED
HPV18 DNA CVX QL PROBE+SIG AMP: NOT DETECTED
LMP START DATE: ABNORMAL
MICROORGANISM CVX/VAG CYTO: ABNORMAL
PATHOLOGIST CVX/VAG CYTO: ABNORMAL
SERVICE CMNT-IMP: ABNORMAL
SPECIMEN SOURCE CVX/VAG CYTO: ABNORMAL
STAT OF ADQ CVX/VAG CYTO-IMP: ABNORMAL

## 2022-01-25 ENCOUNTER — PATIENT MESSAGE (OUTPATIENT)
Dept: OBSTETRICS AND GYNECOLOGY | Facility: CLINIC | Age: 37
End: 2022-01-25
Payer: MEDICAID

## 2022-01-25 ENCOUNTER — TELEPHONE (OUTPATIENT)
Dept: OBSTETRICS AND GYNECOLOGY | Facility: HOSPITAL | Age: 37
End: 2022-01-25
Payer: MEDICAID

## 2022-02-17 ENCOUNTER — PROCEDURE VISIT (OUTPATIENT)
Dept: OBSTETRICS AND GYNECOLOGY | Facility: CLINIC | Age: 37
End: 2022-02-17
Attending: OBSTETRICS & GYNECOLOGY
Payer: MEDICAID

## 2022-02-17 VITALS
WEIGHT: 108.25 LBS | HEIGHT: 62 IN | BODY MASS INDEX: 19.92 KG/M2 | SYSTOLIC BLOOD PRESSURE: 108 MMHG | DIASTOLIC BLOOD PRESSURE: 68 MMHG

## 2022-02-17 DIAGNOSIS — R87.612 LGSIL ON PAP SMEAR OF CERVIX: Primary | ICD-10-CM

## 2022-02-17 DIAGNOSIS — D84.9 IMMUNOCOMPROMISED: ICD-10-CM

## 2022-02-17 DIAGNOSIS — Z23 NEED FOR HPV VACCINATION: ICD-10-CM

## 2022-02-17 DIAGNOSIS — Z76.89 ENCOUNTER FOR BIOPSY: ICD-10-CM

## 2022-02-17 DIAGNOSIS — N87.0 CIN I (CERVICAL INTRAEPITHELIAL NEOPLASIA I): ICD-10-CM

## 2022-02-17 LAB
B-HCG UR QL: NEGATIVE
CTP QC/QA: YES

## 2022-02-17 PROCEDURE — 88305 TISSUE EXAM BY PATHOLOGIST: CPT | Mod: 26,,, | Performed by: STUDENT IN AN ORGANIZED HEALTH CARE EDUCATION/TRAINING PROGRAM

## 2022-02-17 PROCEDURE — 57454 COLPOSCOPY: ICD-10-PCS | Mod: S$PBB,,,

## 2022-02-17 PROCEDURE — 88305 TISSUE EXAM BY PATHOLOGIST: ICD-10-PCS | Mod: 26,,, | Performed by: STUDENT IN AN ORGANIZED HEALTH CARE EDUCATION/TRAINING PROGRAM

## 2022-02-17 PROCEDURE — 57454 BX/CURETT OF CERVIX W/SCOPE: CPT | Mod: S$PBB,,,

## 2022-02-17 PROCEDURE — 99499 UNLISTED E&M SERVICE: CPT | Mod: S$PBB,,, | Performed by: OBSTETRICS & GYNECOLOGY

## 2022-02-17 PROCEDURE — 57454 BX/CURETT OF CERVIX W/SCOPE: CPT | Mod: PBBFAC

## 2022-02-17 PROCEDURE — 99499 NO LOS: ICD-10-PCS | Mod: S$PBB,,, | Performed by: OBSTETRICS & GYNECOLOGY

## 2022-02-17 PROCEDURE — 88305 TISSUE EXAM BY PATHOLOGIST: CPT | Performed by: STUDENT IN AN ORGANIZED HEALTH CARE EDUCATION/TRAINING PROGRAM

## 2022-02-17 PROCEDURE — 81025 URINE PREGNANCY TEST: CPT | Mod: PBBFAC | Performed by: OBSTETRICS & GYNECOLOGY

## 2022-02-17 NOTE — PROCEDURES
Colposcopy    Date/Time: 2/17/2022 2:15 PM  Performed by: Ebony Moss MD  Authorized by: Ebony Moss MD     Consent Done?:  Yes (Written)    Colposcopy Site:  Cervix  Acrowhite Lesion? Yes (9 o'clock, 12 o'clock)    Atypical Vessels: No    Transformation Zone Adequate?: Yes    Biopsy?: Yes         Location:  Cervix ((9 00 and 12 00))  ECC Performed?: Yes    Estimated blood loss (cc):  10   Patient tolerated the procedure well with no immediate complications.   Post-operative instructions were provided for the patient.     Monsels administered on cervix following conclusion of procedure      Ebony Moss MD  PGY-1 OB/GYN

## 2022-02-25 LAB
FINAL PATHOLOGIC DIAGNOSIS: NORMAL
Lab: NORMAL

## 2022-03-03 ENCOUNTER — CLINICAL SUPPORT (OUTPATIENT)
Dept: OBSTETRICS AND GYNECOLOGY | Facility: CLINIC | Age: 37
End: 2022-03-03
Payer: MEDICAID

## 2022-03-03 DIAGNOSIS — R87.612 LGSIL ON PAP SMEAR OF CERVIX: ICD-10-CM

## 2022-03-03 DIAGNOSIS — Z23 NEED FOR VACCINATION: Primary | ICD-10-CM

## 2022-03-03 PROCEDURE — 90471 IMMUNIZATION ADMIN: CPT | Mod: PBBFAC

## 2022-03-03 NOTE — PROGRESS NOTES
Patient here to receive gardasil # 1   to the LEFT deltoid. Tolerated well, no reaction noted. Instructed to wait 15 minutes after administration for monitoring.      Pre pain scale: none     Post pain scale: none

## 2022-03-23 ENCOUNTER — OFFICE VISIT (OUTPATIENT)
Dept: OPHTHALMOLOGY | Facility: CLINIC | Age: 37
End: 2022-03-23
Payer: MEDICAID

## 2022-03-23 DIAGNOSIS — E10.9 TYPE 1 DIABETES MELLITUS WITHOUT RETINOPATHY: ICD-10-CM

## 2022-03-23 DIAGNOSIS — Z96.1 PSEUDOPHAKIA OF BOTH EYES: ICD-10-CM

## 2022-03-23 DIAGNOSIS — H26.493 BILATERAL POSTERIOR CAPSULAR OPACIFICATION: Primary | ICD-10-CM

## 2022-03-23 PROCEDURE — 1159F PR MEDICATION LIST DOCUMENTED IN MEDICAL RECORD: ICD-10-PCS | Mod: CPTII,,, | Performed by: STUDENT IN AN ORGANIZED HEALTH CARE EDUCATION/TRAINING PROGRAM

## 2022-03-23 PROCEDURE — 1160F RVW MEDS BY RX/DR IN RCRD: CPT | Mod: CPTII,,, | Performed by: STUDENT IN AN ORGANIZED HEALTH CARE EDUCATION/TRAINING PROGRAM

## 2022-03-23 PROCEDURE — 92004 COMPRE OPH EXAM NEW PT 1/>: CPT | Mod: S$PBB,,, | Performed by: STUDENT IN AN ORGANIZED HEALTH CARE EDUCATION/TRAINING PROGRAM

## 2022-03-23 PROCEDURE — 3044F HG A1C LEVEL LT 7.0%: CPT | Mod: CPTII,,, | Performed by: STUDENT IN AN ORGANIZED HEALTH CARE EDUCATION/TRAINING PROGRAM

## 2022-03-23 PROCEDURE — 99999 PR PBB SHADOW E&M-EST. PATIENT-LVL II: ICD-10-PCS | Mod: PBBFAC,,, | Performed by: STUDENT IN AN ORGANIZED HEALTH CARE EDUCATION/TRAINING PROGRAM

## 2022-03-23 PROCEDURE — 99212 OFFICE O/P EST SF 10 MIN: CPT | Mod: PBBFAC | Performed by: STUDENT IN AN ORGANIZED HEALTH CARE EDUCATION/TRAINING PROGRAM

## 2022-03-23 PROCEDURE — 3044F PR MOST RECENT HEMOGLOBIN A1C LEVEL <7.0%: ICD-10-PCS | Mod: CPTII,,, | Performed by: STUDENT IN AN ORGANIZED HEALTH CARE EDUCATION/TRAINING PROGRAM

## 2022-03-23 PROCEDURE — 92004 PR EYE EXAM, NEW PATIENT,COMPREHESV: ICD-10-PCS | Mod: S$PBB,,, | Performed by: STUDENT IN AN ORGANIZED HEALTH CARE EDUCATION/TRAINING PROGRAM

## 2022-03-23 PROCEDURE — 1159F MED LIST DOCD IN RCRD: CPT | Mod: CPTII,,, | Performed by: STUDENT IN AN ORGANIZED HEALTH CARE EDUCATION/TRAINING PROGRAM

## 2022-03-23 PROCEDURE — 99999 PR PBB SHADOW E&M-EST. PATIENT-LVL II: CPT | Mod: PBBFAC,,, | Performed by: STUDENT IN AN ORGANIZED HEALTH CARE EDUCATION/TRAINING PROGRAM

## 2022-03-23 PROCEDURE — 1160F PR REVIEW ALL MEDS BY PRESCRIBER/CLIN PHARMACIST DOCUMENTED: ICD-10-PCS | Mod: CPTII,,, | Performed by: STUDENT IN AN ORGANIZED HEALTH CARE EDUCATION/TRAINING PROGRAM

## 2022-03-23 NOTE — PROGRESS NOTES
Subjective:       Patient ID: Nina Murray is a 36 y.o. female.    Chief Complaint: PCO    HPI     Pt states she was referred by her PCP Dr. Mendes but also seen an eye doctor   and was told she had dust in her eyes and her lens needed to be replaced   she believes her OS. Pt states she was using an eye drop at 8:00 PM each   night to prevent something she doesn't know what the name of the drop and   why she was using it. Pt states she had cataract surgery in her OD;   2/11/2015 and her OS 2/18/2015 by Dr. Larkin.     Meds;  No GTTS    Last edited by Vanessa Sánchez on 3/23/2022 11:15 AM. (History)               Assessment & Plan   Bilateral posterior capsular opacification    Pseudophakia of both eyes    Type 1 diabetes mellitus without retinopathy    PCO OU  Peripheral and likely bothering OS>OD at night time  RTC LASER day for YPC OS  Explained that due to thickness of capsule opacification, may not be able to completely remove    Pseudophakia  2015 Dr. Franco  Lenses WC, monitor    DM1 without retinopathy  DFE Monitory yearly        RTC LASER day YPC OS    Kerry Joseph M.D., M.S.  Department of Ophthalmology   Division of Glaucoma Surgery  [facility]

## 2022-04-18 ENCOUNTER — OFFICE VISIT (OUTPATIENT)
Dept: OPHTHALMOLOGY | Facility: CLINIC | Age: 37
End: 2022-04-18
Payer: MEDICAID

## 2022-04-18 DIAGNOSIS — Z96.1 PSEUDOPHAKIA OF BOTH EYES: ICD-10-CM

## 2022-04-18 DIAGNOSIS — H26.493 BILATERAL POSTERIOR CAPSULAR OPACIFICATION: Primary | ICD-10-CM

## 2022-04-18 PROCEDURE — 1159F PR MEDICATION LIST DOCUMENTED IN MEDICAL RECORD: ICD-10-PCS | Mod: CPTII,,, | Performed by: STUDENT IN AN ORGANIZED HEALTH CARE EDUCATION/TRAINING PROGRAM

## 2022-04-18 PROCEDURE — 99212 OFFICE O/P EST SF 10 MIN: CPT | Mod: PBBFAC,25 | Performed by: STUDENT IN AN ORGANIZED HEALTH CARE EDUCATION/TRAINING PROGRAM

## 2022-04-18 PROCEDURE — 1160F RVW MEDS BY RX/DR IN RCRD: CPT | Mod: CPTII,,, | Performed by: STUDENT IN AN ORGANIZED HEALTH CARE EDUCATION/TRAINING PROGRAM

## 2022-04-18 PROCEDURE — 1159F MED LIST DOCD IN RCRD: CPT | Mod: CPTII,,, | Performed by: STUDENT IN AN ORGANIZED HEALTH CARE EDUCATION/TRAINING PROGRAM

## 2022-04-18 PROCEDURE — 99999 PR PBB SHADOW E&M-EST. PATIENT-LVL II: ICD-10-PCS | Mod: PBBFAC,,, | Performed by: STUDENT IN AN ORGANIZED HEALTH CARE EDUCATION/TRAINING PROGRAM

## 2022-04-18 PROCEDURE — 3044F PR MOST RECENT HEMOGLOBIN A1C LEVEL <7.0%: ICD-10-PCS | Mod: CPTII,,, | Performed by: STUDENT IN AN ORGANIZED HEALTH CARE EDUCATION/TRAINING PROGRAM

## 2022-04-18 PROCEDURE — 66821 AFTER CATARACT LASER SURGERY: CPT | Mod: PBBFAC,LT | Performed by: STUDENT IN AN ORGANIZED HEALTH CARE EDUCATION/TRAINING PROGRAM

## 2022-04-18 PROCEDURE — 99999 PR PBB SHADOW E&M-EST. PATIENT-LVL II: CPT | Mod: PBBFAC,,, | Performed by: STUDENT IN AN ORGANIZED HEALTH CARE EDUCATION/TRAINING PROGRAM

## 2022-04-18 PROCEDURE — 99499 NO LOS: ICD-10-PCS | Mod: S$PBB,,, | Performed by: STUDENT IN AN ORGANIZED HEALTH CARE EDUCATION/TRAINING PROGRAM

## 2022-04-18 PROCEDURE — 66821 PR DISCISSION,2ND CATARACT,LASER: ICD-10-PCS | Mod: S$PBB,LT,, | Performed by: STUDENT IN AN ORGANIZED HEALTH CARE EDUCATION/TRAINING PROGRAM

## 2022-04-18 PROCEDURE — 66821 AFTER CATARACT LASER SURGERY: CPT | Mod: S$PBB,LT,, | Performed by: STUDENT IN AN ORGANIZED HEALTH CARE EDUCATION/TRAINING PROGRAM

## 2022-04-18 PROCEDURE — 99499 UNLISTED E&M SERVICE: CPT | Mod: S$PBB,,, | Performed by: STUDENT IN AN ORGANIZED HEALTH CARE EDUCATION/TRAINING PROGRAM

## 2022-04-18 PROCEDURE — 1160F PR REVIEW ALL MEDS BY PRESCRIBER/CLIN PHARMACIST DOCUMENTED: ICD-10-PCS | Mod: CPTII,,, | Performed by: STUDENT IN AN ORGANIZED HEALTH CARE EDUCATION/TRAINING PROGRAM

## 2022-04-18 PROCEDURE — 3044F HG A1C LEVEL LT 7.0%: CPT | Mod: CPTII,,, | Performed by: STUDENT IN AN ORGANIZED HEALTH CARE EDUCATION/TRAINING PROGRAM

## 2022-04-18 NOTE — PROGRESS NOTES
Subjective:       Patient ID: Nina Murray is a 36 y.o. female.    Chief Complaint: No chief complaint on file.    HPI     DLS: 3/23/2022 Dr. Joseph    Eye Med's: No gtts    36 y.o. female is here for YAG Laser Capsulotomy of the left eye. Denies   eye pain and f/f. No discomfort.     Last edited by GREGORY Guzmán on 4/18/2022  8:39 AM. (History)               Assessment & Plan   Bilateral posterior capsular opacification    Pseudophakia of both eyes    Laser Posterior Capsulotomy  left eye    Laser Capsulotomy Surgery Consent:  Patient with visually significant capsular opacification negatively impacting visually based ADLs and QOL.  Discussed with patient options, risks and benefits, expectations of laser surgery. We specifically covered the risks of IOP spike, bleeding, lens disruption, persistent visual disturbance,macular edema, retinal detachment,  iritis & pain,  and the need for further surgery.  The patient  voiced good understanding and patient wishes to proceed with surgery.  The patient will likely benefit from laser surgery and patient signed consent for Left Eye.      The correct eye was identified by patient prior to start of the procedure.    YAG Posterior Capsulotomy:    Total Energy:  906 mJ    Total # of Exposures: 236 Burst    Patient tolerated procedure very well       Nina understands the information Dr. Joseph provided at the time of visit.  The patient voices good understanding of these these instructions and agrees with the plan.  Retinal detachment precautions were discussed and patient is to return for increasing flashes, floaters and decreasing vision.  In addition, patient will return to clinic sooner as needed for pain, decreasing vision etc.    Prednisolone 1% QID for 7 Days in the eye with laser treatment      RD precautions:  Discussed with patient symptoms of RD with increased flashes, floaters, decreasing vision.  Patient/Family to call and return immediately to clinic  should the symptoms of RD occur.  patient voice good understanding.    Anterior cortical material   Not able to clear all  Need touch up 1 week      PLAN  PF QID OS    RTC 1 week for touch up YAG - dilate OS only    Kerry Joseph M.D., M.S.  Department of Ophthalmology   Division of Glaucoma Surgery  [facility]

## 2022-04-26 ENCOUNTER — TELEPHONE (OUTPATIENT)
Dept: OBSTETRICS AND GYNECOLOGY | Facility: CLINIC | Age: 37
End: 2022-04-26
Payer: MEDICAID

## 2022-04-26 NOTE — TELEPHONE ENCOUNTER
----- Message from Dane Phelps sent at 4/26/2022 12:37 PM CDT -----  Name of Who is Calling: AMBROSE CROWDER          What is the request in detail: The patient is calling to rCalins her injection appointment. Please advise          Can the clinic reply by [facility]: Yes         What Number to Call Back if not in [facility]: [phone]

## 2022-05-09 ENCOUNTER — CLINICAL SUPPORT (OUTPATIENT)
Dept: OBSTETRICS AND GYNECOLOGY | Facility: CLINIC | Age: 37
End: 2022-05-09
Payer: MEDICAID

## 2022-05-09 DIAGNOSIS — Z23 NEED FOR VACCINATION: Primary | ICD-10-CM

## 2022-05-09 DIAGNOSIS — Z23 NEED FOR HPV VACCINATION: ICD-10-CM

## 2022-05-09 PROCEDURE — 90471 IMMUNIZATION ADMIN: CPT | Mod: PBBFAC

## 2022-05-09 NOTE — PROGRESS NOTES
Here for Gardasil # 2 injection, without complaint at this time. Reports no pain before or after injection. Advised to wait in lobby 15 minutes after injection and report any adverse reactions. Return to clinic as directed for last injection.     Site: [facility]

## 2022-07-18 ENCOUNTER — OFFICE VISIT (OUTPATIENT)
Dept: OPHTHALMOLOGY | Facility: CLINIC | Age: 37
End: 2022-07-18
Payer: MEDICAID

## 2022-07-18 DIAGNOSIS — Z96.1 PSEUDOPHAKIA OF BOTH EYES: Primary | ICD-10-CM

## 2022-07-18 PROCEDURE — 99999 PR PBB SHADOW E&M-EST. PATIENT-LVL II: ICD-10-PCS | Mod: PBBFAC,,, | Performed by: STUDENT IN AN ORGANIZED HEALTH CARE EDUCATION/TRAINING PROGRAM

## 2022-07-18 PROCEDURE — 3044F PR MOST RECENT HEMOGLOBIN A1C LEVEL <7.0%: ICD-10-PCS | Mod: CPTII,,, | Performed by: STUDENT IN AN ORGANIZED HEALTH CARE EDUCATION/TRAINING PROGRAM

## 2022-07-18 PROCEDURE — 99212 OFFICE O/P EST SF 10 MIN: CPT | Mod: PBBFAC | Performed by: STUDENT IN AN ORGANIZED HEALTH CARE EDUCATION/TRAINING PROGRAM

## 2022-07-18 PROCEDURE — 1160F PR REVIEW ALL MEDS BY PRESCRIBER/CLIN PHARMACIST DOCUMENTED: ICD-10-PCS | Mod: CPTII,,, | Performed by: STUDENT IN AN ORGANIZED HEALTH CARE EDUCATION/TRAINING PROGRAM

## 2022-07-18 PROCEDURE — 1160F RVW MEDS BY RX/DR IN RCRD: CPT | Mod: CPTII,,, | Performed by: STUDENT IN AN ORGANIZED HEALTH CARE EDUCATION/TRAINING PROGRAM

## 2022-07-18 PROCEDURE — 3044F HG A1C LEVEL LT 7.0%: CPT | Mod: CPTII,,, | Performed by: STUDENT IN AN ORGANIZED HEALTH CARE EDUCATION/TRAINING PROGRAM

## 2022-07-18 PROCEDURE — 1159F MED LIST DOCD IN RCRD: CPT | Mod: CPTII,,, | Performed by: STUDENT IN AN ORGANIZED HEALTH CARE EDUCATION/TRAINING PROGRAM

## 2022-07-18 PROCEDURE — 1159F PR MEDICATION LIST DOCUMENTED IN MEDICAL RECORD: ICD-10-PCS | Mod: CPTII,,, | Performed by: STUDENT IN AN ORGANIZED HEALTH CARE EDUCATION/TRAINING PROGRAM

## 2022-07-18 PROCEDURE — 92012 INTRM OPH EXAM EST PATIENT: CPT | Mod: S$PBB,,, | Performed by: STUDENT IN AN ORGANIZED HEALTH CARE EDUCATION/TRAINING PROGRAM

## 2022-07-18 PROCEDURE — 92012 PR EYE EXAM, EST PATIENT,INTERMED: ICD-10-PCS | Mod: S$PBB,,, | Performed by: STUDENT IN AN ORGANIZED HEALTH CARE EDUCATION/TRAINING PROGRAM

## 2022-07-18 PROCEDURE — 99999 PR PBB SHADOW E&M-EST. PATIENT-LVL II: CPT | Mod: PBBFAC,,, | Performed by: STUDENT IN AN ORGANIZED HEALTH CARE EDUCATION/TRAINING PROGRAM

## 2022-07-18 NOTE — PROGRESS NOTES
Subjective:       Patient ID: Nina Murray is a 36 y.o. female.    Chief Complaint: Concerns About Ocular Health       HPI     DLS: 4/18/2020 Dr. Joseph    Presents today for overdue1 week touch up YAG OS.  Pt denies eye pain and f/f.  No discomfort.    Last edited by Pili Alvarez MA on 7/18/2022  9:13 AM. (History)            Assessment & Plan   Pseudophakia of both eyes      PCO OU  S/p YPC OS 4/18/22 with cortical material  No need for touch up today OS  OD ok  Better posterior view now      Pseudophakia  2015 Dr. Franco  Lenses WC, monitor    Hx DM1 now S/p kidney-pancreas transplant  Has not seen retina in years  Has PRP and preretinal fibrosis  RTC retina for dilated exam         RTC Dr Orion Joseph M.D., M.S.  Department of Ophthalmology   Division of Glaucoma Surgery  [facility]

## 2022-08-23 ENCOUNTER — TELEPHONE (OUTPATIENT)
Dept: ORTHOPEDICS | Facility: CLINIC | Age: 37
End: 2022-08-23
Payer: MEDICAID

## 2022-08-23 NOTE — TELEPHONE ENCOUNTER
Spoke to patient in regards to message. Stated to patient that because her insurance has not changed, she can call the [facility] location [phone] to schedule an appointment with Dr. Toledo. If he do not have a sooner appointment, I stated the [facility] number to call [phone]. Patient stated thank you so much. Thanks.

## 2022-08-23 NOTE — TELEPHONE ENCOUNTER
Returned call. LM for patient to call [phone].    ----- Message from Hernan Grover sent at 8/23/2022  8:33 AM CDT -----  Regarding: Appt Needed According to Patient (Emergent)  Contact: @[phone]  Pt requesting a call back regarding left Hip Pain pt states PCP Dr. Mendes ([facility]) asked the patient to call to get a visit with Dr. Toledo.  I checked Dr. Toldeo calendar for [facility] but there was non available pt has Medicaid. Please call to discuss further.

## 2022-08-23 NOTE — TELEPHONE ENCOUNTER
Appt scheduled with Dr Toledo on 9/6 at 2:30p  ----- Message from Stephanie Rivera sent at 8/23/2022 11:34 AM CDT -----  Regarding: PT returning call to schedule, please call after 2:10, see below  Contact: PT  [phone]   Nina Murray MRN [id number] returning a call   Please reach out to patient regarding scheduling w/Dr. Toledo    Please call after 2:10 she wont be able to answer until then @#  PT  [phone]

## 2022-08-24 ENCOUNTER — TELEPHONE (OUTPATIENT)
Dept: OBSTETRICS AND GYNECOLOGY | Facility: CLINIC | Age: 37
End: 2022-08-24
Payer: MEDICAID

## 2022-08-24 NOTE — TELEPHONE ENCOUNTER
----- Message from Danuta Vasquez sent at 8/24/2022  9:13 AM CDT -----  Regarding: Patient call back  Who called:AMBROSE CROWDER [[id number]]    What is the request in detail: Patient is requesting a call back. Patient would like to know if there is anyway she can be seen earlier in the day 9/6 for her injection. Next available is 9/7. She would like to further discuss.   Please advise.    Can the clinic reply by [facility]? No    Best call back number:[phone]    Additional Information: N/A

## 2022-08-29 ENCOUNTER — TELEPHONE (OUTPATIENT)
Dept: ORTHOPEDICS | Facility: CLINIC | Age: 37
End: 2022-08-29
Payer: MEDICAID

## 2022-08-29 DIAGNOSIS — M25.552 PAIN OF BOTH HIP JOINTS: Primary | ICD-10-CM

## 2022-08-29 DIAGNOSIS — M25.551 PAIN OF BOTH HIP JOINTS: Primary | ICD-10-CM

## 2022-08-30 ENCOUNTER — OFFICE VISIT (OUTPATIENT)
Dept: ORTHOPEDICS | Facility: CLINIC | Age: 37
End: 2022-08-30
Payer: MEDICAID

## 2022-08-30 VITALS
BODY MASS INDEX: 20.51 KG/M2 | WEIGHT: 111.44 LBS | HEART RATE: 90 BPM | HEIGHT: 62 IN | SYSTOLIC BLOOD PRESSURE: 110 MMHG | DIASTOLIC BLOOD PRESSURE: 71 MMHG

## 2022-08-30 DIAGNOSIS — M87.052 AVASCULAR NECROSIS OF LEFT FEMUR: Primary | ICD-10-CM

## 2022-08-30 PROCEDURE — 99214 OFFICE O/P EST MOD 30 MIN: CPT | Mod: PBBFAC,PN | Performed by: ORTHOPAEDIC SURGERY

## 2022-08-30 PROCEDURE — 1159F MED LIST DOCD IN RCRD: CPT | Mod: CPTII,,, | Performed by: ORTHOPAEDIC SURGERY

## 2022-08-30 PROCEDURE — 1160F RVW MEDS BY RX/DR IN RCRD: CPT | Mod: CPTII,,, | Performed by: ORTHOPAEDIC SURGERY

## 2022-08-30 PROCEDURE — 3078F PR MOST RECENT DIASTOLIC BLOOD PRESSURE < 80 MM HG: ICD-10-PCS | Mod: CPTII,,, | Performed by: ORTHOPAEDIC SURGERY

## 2022-08-30 PROCEDURE — 99203 OFFICE O/P NEW LOW 30 MIN: CPT | Mod: S$PBB,,, | Performed by: ORTHOPAEDIC SURGERY

## 2022-08-30 PROCEDURE — 3074F PR MOST RECENT SYSTOLIC BLOOD PRESSURE < 130 MM HG: ICD-10-PCS | Mod: CPTII,,, | Performed by: ORTHOPAEDIC SURGERY

## 2022-08-30 PROCEDURE — 3044F PR MOST RECENT HEMOGLOBIN A1C LEVEL <7.0%: ICD-10-PCS | Mod: CPTII,,, | Performed by: ORTHOPAEDIC SURGERY

## 2022-08-30 PROCEDURE — 99203 PR OFFICE/OUTPT VISIT, NEW, LEVL III, 30-44 MIN: ICD-10-PCS | Mod: S$PBB,,, | Performed by: ORTHOPAEDIC SURGERY

## 2022-08-30 PROCEDURE — 3074F SYST BP LT 130 MM HG: CPT | Mod: CPTII,,, | Performed by: ORTHOPAEDIC SURGERY

## 2022-08-30 PROCEDURE — 99999 PR PBB SHADOW E&M-EST. PATIENT-LVL IV: CPT | Mod: PBBFAC,,, | Performed by: ORTHOPAEDIC SURGERY

## 2022-08-30 PROCEDURE — 1159F PR MEDICATION LIST DOCUMENTED IN MEDICAL RECORD: ICD-10-PCS | Mod: CPTII,,, | Performed by: ORTHOPAEDIC SURGERY

## 2022-08-30 PROCEDURE — 99999 PR PBB SHADOW E&M-EST. PATIENT-LVL IV: ICD-10-PCS | Mod: PBBFAC,,, | Performed by: ORTHOPAEDIC SURGERY

## 2022-08-30 PROCEDURE — 3044F HG A1C LEVEL LT 7.0%: CPT | Mod: CPTII,,, | Performed by: ORTHOPAEDIC SURGERY

## 2022-08-30 PROCEDURE — 1160F PR REVIEW ALL MEDS BY PRESCRIBER/CLIN PHARMACIST DOCUMENTED: ICD-10-PCS | Mod: CPTII,,, | Performed by: ORTHOPAEDIC SURGERY

## 2022-08-30 PROCEDURE — 3008F BODY MASS INDEX DOCD: CPT | Mod: CPTII,,, | Performed by: ORTHOPAEDIC SURGERY

## 2022-08-30 PROCEDURE — 3008F PR BODY MASS INDEX (BMI) DOCUMENTED: ICD-10-PCS | Mod: CPTII,,, | Performed by: ORTHOPAEDIC SURGERY

## 2022-08-30 PROCEDURE — 3078F DIAST BP <80 MM HG: CPT | Mod: CPTII,,, | Performed by: ORTHOPAEDIC SURGERY

## 2022-08-30 NOTE — LETTER
August 30, 2022      [facility]  [address], [facility] [address]  [facility] [address]  Phone: [phone]  Fax: [phone]       Patient: Nina Murray   YOB: 1985  Date of Visit: 08/30/2022    To Whom It May Concern:    Jac Murray  was at [facility] on 08/30/2022. The patient may return to work on 8/31/2022 with restrictions:  No stairs, no lifting or carrying > 20 lbs, no running/jumping. If you have any questions or concerns, or if I can be of further assistance, please do not hesitate to contact me.    Sincerely,    Aravind Toledo MD

## 2022-09-03 PROBLEM — M87.052 AVASCULAR NECROSIS OF LEFT FEMUR: Status: ACTIVE | Noted: 2022-09-03

## 2022-09-03 NOTE — PROGRESS NOTES
Subjective:       Patient ID: Nina Murray is a 36 y.o. female.    Chief Complaint:   Pain of the Left Hip  She comes in for left hip pain for 4 months.  She went for a walk for about 40 minutes back then, and ever since she has been having left hip pain.  She also can no longer wear shoes with heels on them because of pain in the left groin.  She has missed some work because of this.  She is limping every day.  She has a history of pancreas and kidney transplant in 2017.  No fall, accident, injury, history of pertinent orthopedic surgery.  No significant knee pain or distal numbness or tingling.    Past Medical History:   Diagnosis Date    Abnormal Pap smear of cervix     Anemia     Cataract     Diabetes mellitus     Renal failure      Past Surgical History:   Procedure Laterality Date    CATARACT EXTRACTION       SECTION, CLASSIC      COMBINED KIDNEY-PANCREAS TRANSPLANT       Family History   Problem Relation Age of Onset    Breast cancer Neg Hx     Colon cancer Neg Hx     Ovarian cancer Neg Hx     Amblyopia Neg Hx     Blindness Neg Hx     Cataracts Neg Hx     Glaucoma Neg Hx     Macular degeneration Neg Hx     Retinal detachment Neg Hx     Strabismus Neg Hx      Social History     Socioeconomic History    Marital status: Single   Tobacco Use    Smoking status: Never    Smokeless tobacco: Never   Substance and Sexual Activity    Alcohol use: No     Alcohol/week: 0.0 standard drinks    Drug use: No    Sexual activity: Not Currently     Partners: Male     Birth control/protection: None       Current Outpatient Medications   Medication Sig Dispense Refill    ESTARYLLA 0.25-35 mg-mcg per tablet Take 1 tablet by mouth once daily. 90 tablet 3    mycophenolate (MYFORTIC) 180 MG TbEC Take 180 mg by mouth 2 (two) times daily.      NEXPLANON 68 mg Impl       ONE DAILY MULTIVITAMIN 400 mcg Tab TK 1 T PO D  11    predniSONE (DELTASONE) 5 MG tablet TK 4 TS PO D  2    PROGRAF 1 mg Cap TK 5 CS PO BID  2    FEROSUL  "325 mg (65 mg iron) Tab tablet Take 1 tablet by mouth once daily.      mycophenolate (MYFORTIC) 360 MG TbEC SMARTSI Tablet(s) By Mouth Every 12 Hours       No current facility-administered medications for this visit.     Review of patient's allergies indicates:   Allergen Reactions    Vicodin [hydrocodone-acetaminophen] Nausea And Vomiting         Objective:      Vitals:    22 0914   BP: 110/71   Pulse: 90   Weight: 50.5 kg (111 lb 7.1 oz)   Height: 5' 2" (1.575 m)     Physical Exam  Left hip:  Positive C sign, positive Stinchfield sign.  No tenderness at the greater trochanter or iliotibial band.  No tenderness at the SI joint.  The patient has pain in the groin with passive flexion and internal rotation of the hip which is limited.  Knee benign without tenderness or effusion, with normal range of motion.  Skin intact.  Distal neurovascular intact.  Flip test negative.    Imaging Review:   X-rays show signs of osteonecrosis in the femoral heads without evidence of femoral head collapse.  No significant arthrosis.  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  Our colleague Dr. Villagomez has expertise in this area, so she will be referred to him for further evaluation.  She is advised to avoid impact loading of the joint, such as running and jumping.  The patient's pathophysiology was explained in detail with reference to x-rays, models, other visual aids as appropriate.  Treatment options were discussed in detail.  Questions were invited and answered to the patient's satisfaction.    Aravind Toledo MD  Orthopaedic Surgery      "

## 2022-09-06 ENCOUNTER — CLINICAL SUPPORT (OUTPATIENT)
Dept: OBSTETRICS AND GYNECOLOGY | Facility: CLINIC | Age: 37
End: 2022-09-06
Payer: MEDICAID

## 2022-09-06 DIAGNOSIS — Z23 NEED FOR HPV VACCINATION: Primary | ICD-10-CM

## 2022-09-06 PROCEDURE — 99999 PR PBB SHADOW E&M-EST. PATIENT-LVL I: CPT | Mod: PBBFAC,,,

## 2022-09-06 PROCEDURE — 99211 OFF/OP EST MAY X REQ PHY/QHP: CPT | Mod: PBBFAC

## 2022-09-06 PROCEDURE — 99999 PR PBB SHADOW E&M-EST. PATIENT-LVL I: ICD-10-PCS | Mod: PBBFAC,,,

## 2022-09-06 PROCEDURE — 90471 IMMUNIZATION ADMIN: CPT | Mod: PBBFAC

## 2022-09-06 NOTE — PROGRESS NOTES
Here for Gardasil # 3 injection, without complaint at this time. Reports no pain prior to or post injection. Advised to wait in lobby 15 minutes post injection and report any adverse reactions.    SERIES COMPLETE      Site: LD

## 2022-09-13 ENCOUNTER — TELEPHONE (OUTPATIENT)
Dept: ORTHOPEDICS | Facility: CLINIC | Age: 37
End: 2022-09-13
Payer: MEDICAID

## 2022-09-13 NOTE — TELEPHONE ENCOUNTER
Spoke with patient who is aware that she scheduled with the correct provider, Dr DOUGLAS Villagomez      ----- Message from Mandie Coe sent at 9/13/2022  9:17 AM CDT -----  Regarding: Call Back  Contact: Nina Murray  Patient state she think she schedule with the wrong doctor and needed to speak with someone in the office. Nina Rutledge call back number is [phone].

## 2022-09-15 ENCOUNTER — OFFICE VISIT (OUTPATIENT)
Dept: OPHTHALMOLOGY | Facility: CLINIC | Age: 37
End: 2022-09-15
Payer: MEDICAID

## 2022-09-15 DIAGNOSIS — E10.3553 STABLE PROLIFERATIVE DIABETIC RETINOPATHY OF BOTH EYES ASSOCIATED WITH TYPE 1 DIABETES MELLITUS: Primary | ICD-10-CM

## 2022-09-15 DIAGNOSIS — Z96.1 PSEUDOPHAKIA OF BOTH EYES: ICD-10-CM

## 2022-09-15 DIAGNOSIS — H43.813 VITREOUS DEGENERATION OF BOTH EYES: ICD-10-CM

## 2022-09-15 PROCEDURE — 92134 CPTRZ OPH DX IMG PST SGM RTA: CPT | Mod: PBBFAC | Performed by: OPHTHALMOLOGY

## 2022-09-15 PROCEDURE — 99999 PR PBB SHADOW E&M-EST. PATIENT-LVL II: CPT | Mod: PBBFAC,,, | Performed by: OPHTHALMOLOGY

## 2022-09-15 PROCEDURE — 1159F MED LIST DOCD IN RCRD: CPT | Mod: CPTII,,, | Performed by: OPHTHALMOLOGY

## 2022-09-15 PROCEDURE — 3044F HG A1C LEVEL LT 7.0%: CPT | Mod: CPTII,,, | Performed by: OPHTHALMOLOGY

## 2022-09-15 PROCEDURE — 3044F PR MOST RECENT HEMOGLOBIN A1C LEVEL <7.0%: ICD-10-PCS | Mod: CPTII,,, | Performed by: OPHTHALMOLOGY

## 2022-09-15 PROCEDURE — 99214 PR OFFICE/OUTPT VISIT, EST, LEVL IV, 30-39 MIN: ICD-10-PCS | Mod: S$PBB,,, | Performed by: OPHTHALMOLOGY

## 2022-09-15 PROCEDURE — 92134 OCT, RETINA - OU - BOTH EYES: ICD-10-PCS | Mod: 26,S$PBB,, | Performed by: OPHTHALMOLOGY

## 2022-09-15 PROCEDURE — 99212 OFFICE O/P EST SF 10 MIN: CPT | Mod: PBBFAC | Performed by: OPHTHALMOLOGY

## 2022-09-15 PROCEDURE — 1159F PR MEDICATION LIST DOCUMENTED IN MEDICAL RECORD: ICD-10-PCS | Mod: CPTII,,, | Performed by: OPHTHALMOLOGY

## 2022-09-15 PROCEDURE — 99214 OFFICE O/P EST MOD 30 MIN: CPT | Mod: S$PBB,,, | Performed by: OPHTHALMOLOGY

## 2022-09-15 PROCEDURE — 99999 PR PBB SHADOW E&M-EST. PATIENT-LVL II: ICD-10-PCS | Mod: PBBFAC,,, | Performed by: OPHTHALMOLOGY

## 2022-09-15 RX ORDER — ACETAMINOPHEN AND CODEINE PHOSPHATE 300; 30 MG/1; MG/1
1 TABLET ORAL EVERY 8 HOURS PRN
COMMUNITY
Start: 2022-07-14 | End: 2023-02-07

## 2022-09-15 NOTE — PROGRESS NOTES
HPI    DLS: 7/18/2022 Dr. Joseph    36 y.o female is  here for Diabetic ye Exam. Denies eye pain and f/f.   Burning, bilateral. No noticeable VA changes since last visit. Do have   trouble with glare sun light and tale lights from the car.     Eye No gtts  Last edited by GREGORY Guzmán on 9/15/2022 10:19 AM.         A/P    ICD-10-CM ICD-9-CM   1. 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 Pseudophakia of both eyes  Z96.1 V43.1   3. Vitreous degeneration of both eyes  H43.813 379.21       1. 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 Joseph for retina check  PCP Edith Mendes MD   01/19/2022  5.2  A1C     Type 1 DM, had gestational DM worsening, s/p kidney-pancreas transplant    OD: hx of PRP  VA 20/25, regressed NV with good PRP  Plan: Observation, inactive PDR    OS: hx PRP  VA 20/25, regressed NV, good PRP, inactive  Plan: Observation    Recommend good blood pressure control, tight blood glucose control, and good cholesterol control       2. Pseudophakia of both eyes  Good lens position OU  Plan: Observation     3. Vitreous degeneration of both eyes  No RT/RD  Plan: Observation      RTC 6 mo DFE/OCTm OU        I saw and examined the patient and reviewed in detail the findings documented. The final examination findings, image interpretations, and plan as documented in the record represent my personal judgment and conclusions.    Anuj Sears MD  Vitreoretinal Surgery   [facility]

## 2022-09-16 ENCOUNTER — OFFICE VISIT (OUTPATIENT)
Dept: ORTHOPEDICS | Facility: CLINIC | Age: 37
End: 2022-09-16
Payer: MEDICAID

## 2022-09-16 VITALS
DIASTOLIC BLOOD PRESSURE: 67 MMHG | BODY MASS INDEX: 20.77 KG/M2 | TEMPERATURE: 98 F | SYSTOLIC BLOOD PRESSURE: 118 MMHG | HEART RATE: 99 BPM | HEIGHT: 62 IN | WEIGHT: 112.88 LBS

## 2022-09-16 DIAGNOSIS — M87.052 AVASCULAR NECROSIS OF LEFT FEMUR: Primary | ICD-10-CM

## 2022-09-16 PROCEDURE — 99213 OFFICE O/P EST LOW 20 MIN: CPT | Mod: S$PBB,,, | Performed by: ORTHOPAEDIC SURGERY

## 2022-09-16 PROCEDURE — 3078F DIAST BP <80 MM HG: CPT | Mod: CPTII,,, | Performed by: ORTHOPAEDIC SURGERY

## 2022-09-16 PROCEDURE — 3044F PR MOST RECENT HEMOGLOBIN A1C LEVEL <7.0%: ICD-10-PCS | Mod: CPTII,,, | Performed by: ORTHOPAEDIC SURGERY

## 2022-09-16 PROCEDURE — 99999 PR PBB SHADOW E&M-EST. PATIENT-LVL III: ICD-10-PCS | Mod: PBBFAC,,, | Performed by: ORTHOPAEDIC SURGERY

## 2022-09-16 PROCEDURE — 3074F PR MOST RECENT SYSTOLIC BLOOD PRESSURE < 130 MM HG: ICD-10-PCS | Mod: CPTII,,, | Performed by: ORTHOPAEDIC SURGERY

## 2022-09-16 PROCEDURE — 1159F PR MEDICATION LIST DOCUMENTED IN MEDICAL RECORD: ICD-10-PCS | Mod: CPTII,,, | Performed by: ORTHOPAEDIC SURGERY

## 2022-09-16 PROCEDURE — 99999 PR PBB SHADOW E&M-EST. PATIENT-LVL III: CPT | Mod: PBBFAC,,, | Performed by: ORTHOPAEDIC SURGERY

## 2022-09-16 PROCEDURE — 1159F MED LIST DOCD IN RCRD: CPT | Mod: CPTII,,, | Performed by: ORTHOPAEDIC SURGERY

## 2022-09-16 PROCEDURE — 3008F BODY MASS INDEX DOCD: CPT | Mod: CPTII,,, | Performed by: ORTHOPAEDIC SURGERY

## 2022-09-16 PROCEDURE — 99213 OFFICE O/P EST LOW 20 MIN: CPT | Mod: PBBFAC | Performed by: ORTHOPAEDIC SURGERY

## 2022-09-16 PROCEDURE — 3008F PR BODY MASS INDEX (BMI) DOCUMENTED: ICD-10-PCS | Mod: CPTII,,, | Performed by: ORTHOPAEDIC SURGERY

## 2022-09-16 PROCEDURE — 99213 PR OFFICE/OUTPT VISIT, EST, LEVL III, 20-29 MIN: ICD-10-PCS | Mod: S$PBB,,, | Performed by: ORTHOPAEDIC SURGERY

## 2022-09-16 PROCEDURE — 3078F PR MOST RECENT DIASTOLIC BLOOD PRESSURE < 80 MM HG: ICD-10-PCS | Mod: CPTII,,, | Performed by: ORTHOPAEDIC SURGERY

## 2022-09-16 PROCEDURE — 3074F SYST BP LT 130 MM HG: CPT | Mod: CPTII,,, | Performed by: ORTHOPAEDIC SURGERY

## 2022-09-16 PROCEDURE — 3044F HG A1C LEVEL LT 7.0%: CPT | Mod: CPTII,,, | Performed by: ORTHOPAEDIC SURGERY

## 2022-09-30 NOTE — PROGRESS NOTES
Subjective:       Patient ID: Nina Murray is a 36 y.o. female.    Chief Complaint:   Pain of the Left Hip  Referred to my clinic by Dr. Toledo for surgical consideration of her left femoral head osteonecrosis.    Hip pain duration 4-5 months.  No specific inciting event.  Left groin pain, aching, affecting her work schedule, causing her to limp.  She has a history of pancreas and kidney transplant in 2017, she continues to take mycophenolate, prednisone 5mg tabs, and Prograf for this.    Imaging is consistent with left femoral head osteonecrosis, subchondral lucency at the superior portion of the femoral head, centered on lateral views, with concentric femoral head.  Ficat stage 2 osteonecrosis.      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 type 1 diabetes mellitus 9/15/2022     Past Surgical History:   Procedure Laterality Date    CATARACT EXTRACTION       SECTION, CLASSIC      COMBINED KIDNEY-PANCREAS TRANSPLANT       Family History   Problem Relation Age of Onset    Breast cancer Neg Hx     Colon cancer Neg Hx     Ovarian cancer Neg Hx     Amblyopia Neg Hx     Blindness Neg Hx     Cataracts Neg Hx     Glaucoma Neg Hx     Macular degeneration Neg Hx     Retinal detachment Neg Hx     Strabismus Neg Hx      Social History     Socioeconomic History    Marital status: Single   Tobacco Use    Smoking status: Never    Smokeless tobacco: Never   Substance and Sexual Activity    Alcohol use: No     Alcohol/week: 0.0 standard drinks    Drug use: No    Sexual activity: Not Currently     Partners: Male     Birth control/protection: None       Current Outpatient Medications   Medication Sig Dispense Refill    acetaminophen-codeine 300-30mg (TYLENOL #3) 300-30 mg Tab Take 1 tablet by mouth every 8 (eight) hours as needed.      ESTARYLLA 0.25-35  "mg-mcg per tablet Take 1 tablet by mouth once daily. 90 tablet 3    mycophenolate (MYFORTIC) 180 MG TbEC Take 180 mg by mouth 2 (two) times daily.      NEXPLANON 68 mg Impl       ONE DAILY MULTIVITAMIN 400 mcg Tab TK 1 T PO D  11    predniSONE (DELTASONE) 5 MG tablet TK 4 TS PO D  2    PROGRAF 1 mg Cap TK 5 CS PO BID  2    FEROSUL 325 mg (65 mg iron) Tab tablet Take 1 tablet by mouth once daily.      mycophenolate (MYFORTIC) 360 MG TbEC SMARTSI Tablet(s) By Mouth Every 12 Hours       No current facility-administered medications for this visit.     Review of patient's allergies indicates:   Allergen Reactions    Vicodin [hydrocodone-acetaminophen] Nausea And Vomiting         Objective:      Vitals:    22 1427   BP: 118/67   BP Location: Right arm   Patient Position: Sitting   BP Method: Small (Automatic)   Pulse: 99   Temp: 98.3 °F (36.8 °C)   TempSrc: Oral   Weight: 51.2 kg (112 lb 14 oz)   Height: 5' 2" (1.575 m)     Physical Exam  Left hip:  Positive C sign, positive Stinchfield sign.  No tenderness at the greater trochanter or iliotibial band.  The patient has pain in the groin with passive flexion and internal rotation of the hip which is limited.  Knee benign without tenderness or effusion, with normal range of motion.      Imaging Review:   X-rays show signs of osteonecrosis in the femoral heads without evidence of femoral head collapse.  No significant arthrosis.  She had an outside MRI for which we have only a paper report, which confirms the above      Assessment:   Stage II osteonecrosis, left femoral head, symptomatic.    Likely due to prolonged steroid and anti-rejection medications from her pancreas and renal transplants.  These also make her a poor candidate for arthroplasty.  Joint salvage procedures are warranted.  Plan:       Discussed X-Ream surgical core decompression and calcium sulfate/phosphate bone allografting into the lesion to prevent or delay further collapse of her hip.  She is " amenable for surgery.  Will plan for this in the near future at [facility] outpatient surgery center.    Ahmet Villagomez MD  Orthopaedic Oncology

## 2023-01-26 ENCOUNTER — OFFICE VISIT (OUTPATIENT)
Dept: OBSTETRICS AND GYNECOLOGY | Facility: CLINIC | Age: 38
End: 2023-01-26
Attending: OBSTETRICS & GYNECOLOGY
Payer: MEDICAID

## 2023-01-26 VITALS
HEIGHT: 62 IN | SYSTOLIC BLOOD PRESSURE: 108 MMHG | BODY MASS INDEX: 19.88 KG/M2 | WEIGHT: 108 LBS | DIASTOLIC BLOOD PRESSURE: 68 MMHG

## 2023-01-26 DIAGNOSIS — N87.0 CIN I (CERVICAL INTRAEPITHELIAL NEOPLASIA I): ICD-10-CM

## 2023-01-26 DIAGNOSIS — Z01.419 WELL WOMAN EXAM WITH ROUTINE GYNECOLOGICAL EXAM: Primary | ICD-10-CM

## 2023-01-26 DIAGNOSIS — Z30.017 NEXPLANON INSERTION: ICD-10-CM

## 2023-01-26 PROCEDURE — 88175 CYTOPATH C/V AUTO FLUID REDO: CPT | Performed by: PATHOLOGY

## 2023-01-26 PROCEDURE — 3078F PR MOST RECENT DIASTOLIC BLOOD PRESSURE < 80 MM HG: ICD-10-PCS | Mod: CPTII,,, | Performed by: OBSTETRICS & GYNECOLOGY

## 2023-01-26 PROCEDURE — 99999 PR PBB SHADOW E&M-EST. PATIENT-LVL III: CPT | Mod: PBBFAC,,, | Performed by: OBSTETRICS & GYNECOLOGY

## 2023-01-26 PROCEDURE — 87624 HPV HI-RISK TYP POOLED RSLT: CPT | Performed by: OBSTETRICS & GYNECOLOGY

## 2023-01-26 PROCEDURE — 1159F PR MEDICATION LIST DOCUMENTED IN MEDICAL RECORD: ICD-10-PCS | Mod: CPTII,,, | Performed by: OBSTETRICS & GYNECOLOGY

## 2023-01-26 PROCEDURE — 88141 CYTOPATH C/V INTERPRET: CPT | Mod: ,,, | Performed by: PATHOLOGY

## 2023-01-26 PROCEDURE — 1160F PR REVIEW ALL MEDS BY PRESCRIBER/CLIN PHARMACIST DOCUMENTED: ICD-10-PCS | Mod: CPTII,,, | Performed by: OBSTETRICS & GYNECOLOGY

## 2023-01-26 PROCEDURE — 3074F SYST BP LT 130 MM HG: CPT | Mod: CPTII,,, | Performed by: OBSTETRICS & GYNECOLOGY

## 2023-01-26 PROCEDURE — 88141 PR  CYTOPATH CERV/VAG INTERPRET: ICD-10-PCS | Mod: ,,, | Performed by: PATHOLOGY

## 2023-01-26 PROCEDURE — 99213 OFFICE O/P EST LOW 20 MIN: CPT | Mod: PBBFAC,PN | Performed by: OBSTETRICS & GYNECOLOGY

## 2023-01-26 PROCEDURE — 99999 PR PBB SHADOW E&M-EST. PATIENT-LVL III: ICD-10-PCS | Mod: PBBFAC,,, | Performed by: OBSTETRICS & GYNECOLOGY

## 2023-01-26 PROCEDURE — 1160F RVW MEDS BY RX/DR IN RCRD: CPT | Mod: CPTII,,, | Performed by: OBSTETRICS & GYNECOLOGY

## 2023-01-26 PROCEDURE — 3074F PR MOST RECENT SYSTOLIC BLOOD PRESSURE < 130 MM HG: ICD-10-PCS | Mod: CPTII,,, | Performed by: OBSTETRICS & GYNECOLOGY

## 2023-01-26 PROCEDURE — 1159F MED LIST DOCD IN RCRD: CPT | Mod: CPTII,,, | Performed by: OBSTETRICS & GYNECOLOGY

## 2023-01-26 PROCEDURE — 99395 PREV VISIT EST AGE 18-39: CPT | Mod: S$PBB,,, | Performed by: OBSTETRICS & GYNECOLOGY

## 2023-01-26 PROCEDURE — 3008F BODY MASS INDEX DOCD: CPT | Mod: CPTII,,, | Performed by: OBSTETRICS & GYNECOLOGY

## 2023-01-26 PROCEDURE — 99395 PR PREVENTIVE VISIT,EST,18-39: ICD-10-PCS | Mod: S$PBB,,, | Performed by: OBSTETRICS & GYNECOLOGY

## 2023-01-26 PROCEDURE — 3078F DIAST BP <80 MM HG: CPT | Mod: CPTII,,, | Performed by: OBSTETRICS & GYNECOLOGY

## 2023-01-26 PROCEDURE — 3008F PR BODY MASS INDEX (BMI) DOCUMENTED: ICD-10-PCS | Mod: CPTII,,, | Performed by: OBSTETRICS & GYNECOLOGY

## 2023-01-26 RX ORDER — MYCOPHENOLIC ACID 360 MG/1
360 TABLET, DELAYED RELEASE ORAL EVERY 12 HOURS
COMMUNITY
Start: 2023-01-18

## 2023-01-26 RX ORDER — TRAMADOL HYDROCHLORIDE 50 MG/1
50 TABLET ORAL EVERY 6 HOURS PRN
COMMUNITY
Start: 2022-11-16

## 2023-01-26 RX ORDER — PREDNISONE 10 MG/1
TABLET ORAL
COMMUNITY
Start: 2023-01-20

## 2023-01-26 RX ORDER — MELOXICAM 15 MG/1
15 TABLET ORAL
COMMUNITY
Start: 2022-12-08 | End: 2023-02-07

## 2023-01-26 NOTE — PROGRESS NOTES
SUBJECTIVE:   37 y.o. female   for annual routine Pap and checkup. No LMP recorded. Patient has had an implant..  She has no unusual complaints and needs a new nexplanon.  Hers expires in February.  Pap last year was abnormal and colpo was JEAN-PIERRE I.        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 type 1 diabetes mellitus 9/15/2022     Past Surgical History:   Procedure Laterality Date    CATARACT EXTRACTION       SECTION, CLASSIC      COMBINED KIDNEY-PANCREAS TRANSPLANT       Social History     Socioeconomic History    Marital status: Single   Tobacco Use    Smoking status: Never    Smokeless tobacco: Never   Substance and Sexual Activity    Alcohol use: No     Alcohol/week: 0.0 standard drinks    Drug use: No    Sexual activity: Not Currently     Partners: Male     Birth control/protection: None     Family History   Problem Relation Age of Onset    Breast cancer Neg Hx     Colon cancer Neg Hx     Ovarian cancer Neg Hx     Amblyopia Neg Hx     Blindness Neg Hx     Cataracts Neg Hx     Glaucoma Neg Hx     Macular degeneration Neg Hx     Retinal detachment Neg Hx     Strabismus Neg Hx      OB History    Para Term  AB Living   1 1   1   1   SAB IAB Ectopic Multiple Live Births                  # Outcome Date GA Lbr Nahun/2nd Weight Sex Delivery Anes PTL Lv   1  13    M CS-LVertical  Y          Current Outpatient Medications   Medication Sig Dispense Refill    acetaminophen-codeine 300-30mg (TYLENOL #3) 300-30 mg Tab Take 1 tablet by mouth every 8 (eight) hours as needed.      meloxicam (MOBIC) 15 MG tablet Take 15 mg by mouth.      mycophenolate (MYFORTIC) 180 MG TbEC Take 180 mg by mouth 2 (two) times daily.      mycophenolate (MYFORTIC) 360 MG TbEC Take 360 mg by mouth every 12 (twelve) hours.      NEXPLANON 68  "mg Impl       ONE DAILY MULTIVITAMIN 400 mcg Tab TK 1 T PO D  11    predniSONE (DELTASONE) 10 MG tablet Take by mouth.      predniSONE (DELTASONE) 5 MG tablet TK 4 TS PO D  2    PROGRAF 1 mg Cap TK 5 CS PO BID  2    traMADoL (ULTRAM) 50 mg tablet Take 50 mg by mouth every 6 (six) hours as needed.      ESTARYLLA 0.25-35 mg-mcg per tablet Take 1 tablet by mouth once daily. 90 tablet 3     No current facility-administered medications for this visit.     Allergies: Hydrocodone-acetaminophen     ROS:  Constitutional: no weight loss, weight gain, fever, fatigue  Eyes:  No vision changes, glasses/contacts  ENT/Mouth: No ulcers, sinus problems, ears ringing, headache  Cardiovascular: No inability to lie flat, chest pain, exercise intolerance, swelling, heart palpitations  Respiratory: No wheezing, coughing blood, shortness of breath, or cough  Gastrointestinal: No diarrhea, bloody stool, nausea/vomiting, constipation, gas, hemorrhoids  Genitourinary: No blood in urine, painful urination, urgency of urination, frequency of urination, incomplete emptying, incontinence, abnormal bleeding, painful periods, heavy periods, vaginal discharge, vaginal odor, painful intercourse, sexual problems, bleeding after intercourse.  Musculoskeletal: No muscle weakness  Skin/Breast: No painful breasts, nipple discharge, masses, rash, ulcers  Neurological: No passing out, seizures, numbness, headache  Endocrine: No diabetes, hypothyroid, hyperthyroid, hot flashes, hair loss, abnormal hair growth, ance  Psychiatric: No depression, crying  Hematologic: No bruises, bleeding, swollen lymph nodes, anemia.      OBJECTIVE:   The patient appears well, alert, oriented x 3, in no distress.  /68   Ht 5' 2" (1.575 m)   Wt 49 kg (108 lb 0.4 oz)   BMI 19.76 kg/m²   NECK: no thyromegaly, trachea midline  SKIN: no acne, striae, hirsutism  BREAST EXAM: breasts appear normal, no suspicious masses, no skin or nipple changes or axillary nodes  ABDOMEN: " no hernias, masses, or hepatosplenomegaly  GENITALIA: normal external genitalia, no erythema, no discharge  URETHRA: normal urethra, normal urethral meatus  VAGINA: Normal  CERVIX: no lesions or cervical motion tenderness  UTERUS: normal size, contour, position, consistency, mobility, non-tender  ADNEXA: normal adnexa and no mass, fullness, tenderness    \  ASSESSMENT:   Becca was seen today for well woman.    Diagnoses and all orders for this visit:    Well woman exam with routine gynecological exam  -     Liquid-Based Pap Smear, Screening  -     HPV High Risk Genotypes, PCR    JEAN-PIERRE I (cervical intraepithelial neoplasia I)  -     Liquid-Based Pap Smear, Screening  -     HPV High Risk Genotypes, PCR    Nexplanon insertion  -     Device Authorization Order

## 2023-02-06 ENCOUNTER — TELEPHONE (OUTPATIENT)
Dept: OBSTETRICS AND GYNECOLOGY | Facility: HOSPITAL | Age: 38
End: 2023-02-06
Payer: MEDICAID

## 2023-02-06 LAB
FINAL PATHOLOGIC DIAGNOSIS: ABNORMAL
Lab: ABNORMAL

## 2023-02-07 NOTE — TELEPHONE ENCOUNTER
Pt returning Dr. Tolentino's call   Informed Dr. Tolentino is on call this week  Will send message, please allow time to review and respond

## 2023-02-13 ENCOUNTER — TELEPHONE (OUTPATIENT)
Dept: OBSTETRICS AND GYNECOLOGY | Facility: CLINIC | Age: 38
End: 2023-02-13
Payer: MEDICAID

## 2023-02-13 ENCOUNTER — OFFICE VISIT (OUTPATIENT)
Dept: OPTOMETRY | Facility: CLINIC | Age: 38
End: 2023-02-13
Payer: MEDICAID

## 2023-02-13 ENCOUNTER — TELEPHONE (OUTPATIENT)
Dept: OPHTHALMOLOGY | Facility: CLINIC | Age: 38
End: 2023-02-13
Payer: MEDICAID

## 2023-02-13 DIAGNOSIS — Z01.00 EYE EXAM, ROUTINE: Primary | ICD-10-CM

## 2023-02-13 DIAGNOSIS — Z96.1 PSEUDOPHAKIA OF BOTH EYES: ICD-10-CM

## 2023-02-13 DIAGNOSIS — H52.03 HYPEROPIA OF BOTH EYES: ICD-10-CM

## 2023-02-13 PROCEDURE — 99999 PR PBB SHADOW E&M-EST. PATIENT-LVL I: ICD-10-PCS | Mod: PBBFAC,,, | Performed by: OPTOMETRIST

## 2023-02-13 PROCEDURE — 92004 COMPRE OPH EXAM NEW PT 1/>: CPT | Mod: S$PBB,,, | Performed by: OPTOMETRIST

## 2023-02-13 PROCEDURE — 99999 PR PBB SHADOW E&M-EST. PATIENT-LVL I: CPT | Mod: PBBFAC,,, | Performed by: OPTOMETRIST

## 2023-02-13 PROCEDURE — 92004 PR EYE EXAM, NEW PATIENT,COMPREHESV: ICD-10-PCS | Mod: S$PBB,,, | Performed by: OPTOMETRIST

## 2023-02-13 PROCEDURE — 92015 DETERMINE REFRACTIVE STATE: CPT | Mod: ,,, | Performed by: OPTOMETRIST

## 2023-02-13 PROCEDURE — 92015 PR REFRACTION: ICD-10-PCS | Mod: ,,, | Performed by: OPTOMETRIST

## 2023-02-13 PROCEDURE — 1159F PR MEDICATION LIST DOCUMENTED IN MEDICAL RECORD: ICD-10-PCS | Mod: CPTII,,, | Performed by: OPTOMETRIST

## 2023-02-13 PROCEDURE — 1159F MED LIST DOCD IN RCRD: CPT | Mod: CPTII,,, | Performed by: OPTOMETRIST

## 2023-02-13 PROCEDURE — 99211 OFF/OP EST MAY X REQ PHY/QHP: CPT | Mod: PBBFAC | Performed by: OPTOMETRIST

## 2023-02-13 NOTE — TELEPHONE ENCOUNTER
----- Message from Ashleigh Lanier sent at 2/13/2023  3:32 PM CST -----  Regarding: Reschedule  Contact: AMBROSE CROWDER [[id number]]  Name of Who is Calling: Ambrose Crowder             What is the request in detail: Pt states she is requesting to reschedule her upcoming procedure, she wants to know if she can push it back to next week. Please advise            [facility]           What Number to Call Back if not in [facility]: Home Phone      [phone]  Work Phone      Not on file.  Mobile          [phone]  Home Phone      [phone]  Mobile          [phone]

## 2023-02-13 NOTE — PROGRESS NOTES
HPI    Refractive Exam  [facility] PDR   Last edited by Gonzalo Leiva, OD on 2/13/2023 11:48 AM.            Assessment /Plan     For exam results, see Encounter Report.    Eye exam, routine  -Medicaid refractive exam  -[facility]-PDR    Pseudophakia of both eyes  Hyperopia of both eyes  Eyeglass Final Rx       Eyeglass Final Rx         Sphere Cylinder Axis Dist VA Add    Right +0.75 Sphere  20/20-- +2.50    Left +0.75 +0.50 165 20/20       Type: PAL    Expiration Date: 2/13/2024                      RTC 1 yr as directed Dr Sears

## 2023-02-22 ENCOUNTER — PROCEDURE VISIT (OUTPATIENT)
Dept: OBSTETRICS AND GYNECOLOGY | Facility: CLINIC | Age: 38
End: 2023-02-22
Attending: OBSTETRICS & GYNECOLOGY
Payer: MEDICAID

## 2023-02-22 VITALS
WEIGHT: 110.25 LBS | SYSTOLIC BLOOD PRESSURE: 110 MMHG | HEIGHT: 60 IN | DIASTOLIC BLOOD PRESSURE: 68 MMHG | BODY MASS INDEX: 21.65 KG/M2

## 2023-02-22 DIAGNOSIS — Z30.46 NEXPLANON REMOVAL: Primary | ICD-10-CM

## 2023-02-22 DIAGNOSIS — Z30.9 ENCOUNTER FOR CONTRACEPTIVE MANAGEMENT, UNSPECIFIED TYPE: ICD-10-CM

## 2023-02-22 PROCEDURE — 11982 REMOVE DRUG IMPLANT DEVICE: CPT | Mod: PBBFAC

## 2023-02-22 PROCEDURE — 11982 REMOVE DRUG IMPLANT DEVICE: CPT | Mod: S$PBB,,,

## 2023-02-22 PROCEDURE — 11982 REMOVAL OF NEXPLANON DEVICE: ICD-10-PCS | Mod: S$PBB,,,

## 2023-02-22 RX ORDER — NORGESTIMATE AND ETHINYL ESTRADIOL 0.25-0.035
1 KIT ORAL DAILY
Qty: 90 TABLET | Refills: 3 | Status: SHIPPED | OUTPATIENT
Start: 2023-02-22 | End: 2024-02-22

## 2023-02-22 NOTE — PROCEDURES
Removal of Nexplanon Device    Date/Time: 2/22/2023 3:40 PM  Performed by: Kathleen Denney MD  Authorized by: Kathleen Denney MD   Preparation: Patient was prepped and draped in the usual sterile fashion.  Local anesthesia used: yes  Anesthesia: local infiltration    Anesthesia:  Local anesthesia used: yes  Local Anesthetic: lidocaine 1% with epinephrine  Anesthetic total: 3 mL    Sedation:  Patient sedated: no    Patient tolerance: patient tolerated the procedure well with no immediate complications  Comments: Nexplanon removed    Pt desired to continue on OCP for contraception/to have regular menses  Estarylla refilled         Kathleen Denney MD  OBGYN PGY-2

## 2023-03-14 ENCOUNTER — PROCEDURE VISIT (OUTPATIENT)
Dept: OBSTETRICS AND GYNECOLOGY | Facility: CLINIC | Age: 38
End: 2023-03-14
Attending: OBSTETRICS & GYNECOLOGY
Payer: MEDICAID

## 2023-03-14 VITALS
DIASTOLIC BLOOD PRESSURE: 72 MMHG | WEIGHT: 110 LBS | HEIGHT: 60 IN | BODY MASS INDEX: 21.6 KG/M2 | SYSTOLIC BLOOD PRESSURE: 112 MMHG

## 2023-03-14 DIAGNOSIS — Z76.89 ENCOUNTER FOR BIOPSY: ICD-10-CM

## 2023-03-14 DIAGNOSIS — R87.612 LGSIL ON PAP SMEAR OF CERVIX: Primary | ICD-10-CM

## 2023-03-14 LAB
B-HCG UR QL: NEGATIVE
CTP QC/QA: YES

## 2023-03-14 PROCEDURE — 88305 TISSUE EXAM BY PATHOLOGIST: ICD-10-PCS | Mod: 26,,, | Performed by: PATHOLOGY

## 2023-03-14 PROCEDURE — 57454 COLPOSCOPY: ICD-10-PCS | Mod: S$PBB,,,

## 2023-03-14 PROCEDURE — 81025 URINE PREGNANCY TEST: CPT | Mod: PBBFAC,PN | Performed by: OBSTETRICS & GYNECOLOGY

## 2023-03-14 PROCEDURE — 57454 BX/CURETT OF CERVIX W/SCOPE: CPT | Mod: S$PBB,,,

## 2023-03-14 PROCEDURE — 88305 TISSUE EXAM BY PATHOLOGIST: CPT | Mod: 59 | Performed by: PATHOLOGY

## 2023-03-14 PROCEDURE — 88305 TISSUE EXAM BY PATHOLOGIST: CPT | Mod: 26,,, | Performed by: PATHOLOGY

## 2023-03-14 PROCEDURE — 57454 BX/CURETT OF CERVIX W/SCOPE: CPT | Mod: PBBFAC,PN

## 2023-03-14 RX ORDER — MELOXICAM 7.5 MG/1
7.5 TABLET ORAL
COMMUNITY
Start: 2023-02-23

## 2023-03-14 RX ORDER — OMEPRAZOLE 20 MG/1
20 CAPSULE, DELAYED RELEASE ORAL
COMMUNITY
Start: 2023-02-23

## 2023-03-14 NOTE — PROCEDURES
Colposcopy    Date/Time: 3/14/2023 11:15 AM  Performed by: Ebony Moss MD  Authorized by: Ebony Moss MD     Consent Done?:  Yes (Written)  Timeout:Immediately prior to procedure a time out was called to verify the correct patient, procedure, equipment, support staff and site/side marked as required    Colposcopy Site:  Cervix  Acrowhite Lesion? Yes    Atypical Vessels: No    Transformation Zone Adequate?: No    Biopsy?: Yes         Location:  Cervix ((11 00 and 7 00))  ECC Performed?: Yes    LEEP Performed?: No    Estimated blood loss (cc):  1   Patient tolerated the procedure well with no immediate complications.     UPT negative prior to procedure. Cervix hemostatic after procedure    Ebony Moss MD  PGY-2 OB/GYN

## 2023-03-20 LAB
FINAL PATHOLOGIC DIAGNOSIS: NORMAL
GROSS: NORMAL
Lab: NORMAL

## 2023-03-22 ENCOUNTER — PATIENT MESSAGE (OUTPATIENT)
Dept: OBSTETRICS AND GYNECOLOGY | Facility: CLINIC | Age: 38
End: 2023-03-22
Payer: MEDICAID